# Patient Record
Sex: FEMALE | Race: WHITE | Employment: OTHER | URBAN - METROPOLITAN AREA
[De-identification: names, ages, dates, MRNs, and addresses within clinical notes are randomized per-mention and may not be internally consistent; named-entity substitution may affect disease eponyms.]

---

## 2018-05-15 ENCOUNTER — HOSPITAL ENCOUNTER (OUTPATIENT)
Dept: GENERAL RADIOLOGY | Age: 73
Discharge: HOME | End: 2018-05-15
Attending: FAMILY MEDICINE
Payer: MEDICARE

## 2018-05-15 DIAGNOSIS — J44.9 CHRONIC OBSTRUCTIVE PULMONARY DISEASE, UNSPECIFIED COPD TYPE (HCC): Chronic | ICD-10-CM

## 2018-05-15 PROCEDURE — 71046 X-RAY EXAM CHEST 2 VIEWS: CPT

## 2019-08-23 ENCOUNTER — TELEPHONE (OUTPATIENT)
Dept: CASE MANAGEMENT | Age: 74
End: 2019-08-23

## 2019-10-28 PROBLEM — K55.9 VASCULAR INSUFFICIENCY OF INTESTINE (HCC): Status: RESOLVED | Noted: 2019-10-28 | Resolved: 2019-10-28

## 2019-10-28 PROBLEM — K55.9 VASCULAR INSUFFICIENCY OF INTESTINE (HCC): Status: ACTIVE | Noted: 2019-10-28

## 2020-07-10 ENCOUNTER — HOSPITAL ENCOUNTER (OUTPATIENT)
Dept: WOUND CARE | Age: 75
Discharge: HOME | End: 2020-07-10
Attending: SURGERY
Payer: MEDICARE

## 2020-07-10 VITALS
OXYGEN SATURATION: 95 % | WEIGHT: 140 LBS | HEIGHT: 70 IN | BODY MASS INDEX: 20.04 KG/M2 | RESPIRATION RATE: 20 BRPM | DIASTOLIC BLOOD PRESSURE: 78 MMHG | HEART RATE: 82 BPM | TEMPERATURE: 97.9 F | SYSTOLIC BLOOD PRESSURE: 149 MMHG

## 2020-07-10 PROCEDURE — 29581 APPL MULTLAYER CMPRN SYS LEG: CPT

## 2020-07-10 PROCEDURE — 99213 OFFICE O/P EST LOW 20 MIN: CPT

## 2020-07-10 NOTE — WOUND CARE
[address] [place] [address], [facility] [address] Phone: [phone] Fax: [phone] Patient: Francesca Eisenmenger MRN: [id number]  SSN: [id number] YOB: 1945  Age: 66 y.o. Sex: female Return Appointment: 1 week with Keyur Chaney MD  
Tuesday for Dressing Change with Clinician Instructions: RLE Cleanse with Normal Saline Apply Xeroform- apply to wound bed. Cover with ABD Wrap with CoFlex TLC-2 layer compression with wound and lower extremity assessment. If there is any problem with the dressing (too tight, slides down, etc.) Patient to return to wound clinic to have re-wrapped by clinician. DO NOT WET-PURCHASE CAST COVER AT [facility] OR [facility] 
 
Should you experience increased redness, swelling, pain, foul odor, size of wound(s), or have a temperature over 101 degrees please contact the [address] [place] [address] Road at [phone] or if after hours contact your primary care physician or go to the hospital emergency department. Signed By: Heena Lira RN   
 July 10, 2020

## 2020-07-10 NOTE — WOUND CARE
07/10/20 1328 Wound Leg Lower Right;Lateral  
Date First Assessed: 07/10/20   Wound Type: Venous  Location: Leg Lower  Orientation: Right;Lateral  
Dressing Type  Open to air Non-Pressure Injury Full thickness (subcut/muscle) Wound Length (cm) 2.2 cm Wound Width (cm) 1.4 cm Wound Depth (cm) 0.2 Wound Surface area (cm^2) 3.08 cm^2 Condition of Base Slough;Granulation Tissue Type Percent Red 20 Tissue Type Percent Yellow 80 Drainage Amount  Moderate Drainage Color Serosanguinous Wound Odor None Periwound Skin Condition Edematous; Erythema, blanchable Cleansing and Cleansing Agents  Normal saline

## 2020-07-10 NOTE — WOUND CARE
Multilayer Compression Wrap 
 (Not Unna) Below the Knee NAME:  Vandana Whipple YOB: 1945 MEDICAL RECORD NUMBER:  [id number] DATE:  7/10/2020 Applied primary and secondary dressing as ordered. Applied multilayered dressing below the knee to right lower leg. Instructed patient/caregiver not to remove dressing and to keep it clean and dry. Instructed patient/caregiver on complications to report to provider, such as pain, numbness in toes, heavy drainage, and slippage of dressing. Instructed patient on purpose of compression dressing and on activity and exercise recommendations.  
 
 
Electronically signed by Nati Allen RN on 7/10/2020 at 2:05 PM

## 2020-07-10 NOTE — DISCHARGE INSTRUCTIONS
[address]  [address], [address]  Phone: [phone]  Fax: [phone]    Patient: Jack Contreras MRN: [id number]  SSN: [id number]    YOB: 1945  Age: 66 y.o. Sex: female       Return Appointment: 1 week with Demarcus Saleh MD    Instructions: RLE    Cleanse with Normal Saline  Apply Xeroform- apply to wound bed. Cover with ABD  Wrap with CoFlex TLC-2 layer compression with wound and lower extremity assessment. If there is any problem with the dressing (too tight, slides down, etc.) Patient to return to wound clinic to have re-wrapped by clinician. DO NOT WET-PURCHASE CAST COVER AT [facility] OR [facility]    Should you experience increased redness, swelling, pain, foul odor, size of wound(s), or have a temperature over 101 degrees please contact the [address] [place] [address] Road at [phone] or if after hours contact your primary care physician or go to the hospital emergency department.     Signed By: Anila Goldberg RN     July 10, 2020

## 2020-07-14 ENCOUNTER — HOSPITAL ENCOUNTER (OUTPATIENT)
Dept: WOUND CARE | Age: 75
Discharge: HOME | End: 2020-07-14
Attending: SURGERY
Payer: MEDICARE

## 2020-07-14 VITALS — HEIGHT: 70 IN | TEMPERATURE: 98.7 F | WEIGHT: 140 LBS | BODY MASS INDEX: 20.04 KG/M2

## 2020-07-14 PROCEDURE — 29581 APPL MULTLAYER CMPRN SYS LEG: CPT

## 2020-07-14 NOTE — PROGRESS NOTES
07/14/20 0954   Wound Leg Lower Right;Lateral   Date First Assessed: 07/10/20   Wound Type: Venous  Location: Leg Lower  Orientation: Right;Lateral   Dressing Status  Old drainage   Dressing Type    (Xeroform, Adhesive bandage)   Non-Pressure Injury Full thickness (subcut/muscle)   Wound Length (cm) 2.1 cm   Wound Width (cm) 1.2 cm   Wound Depth (cm) 0.1   Wound Surface area (cm^2) 2.52 cm^2   Change in Wound Size % 18.18   Condition of Base Granulation;Slough   Tissue Type Percent Red 80   Tissue Type Percent Yellow 20   Drainage Amount  Moderate   Drainage Color Serosanguinous   Wound Odor None   Periwound Skin Condition Macerated   Cleansing and Cleansing Agents  Normal saline; Soap and water   Dressing Type Applied   (Xeroform, ABD, Coflex TLC)

## 2020-07-14 NOTE — WOUND CARE
Multilayer Compression Wrap   (Not Unna) Below the Knee    NAME:  Keli Oglesby OF BIRTH:  1945  MEDICAL RECORD NUMBER:  [id number]  DATE:  7/14/2020    Removed old Multilayer wrap if indicated and wash leg with mild soap/water. Applied primary and secondary dressing as ordered. Applied multilayered dressing below the knee to right lower leg. Instructed patient/caregiver not to remove dressing and to keep it clean and dry. Instructed patient/caregiver on complications to report to provider, such as pain, numbness in toes, heavy drainage, and slippage of dressing. Instructed patient on purpose of compression dressing and on activity and exercise recommendations.       Electronically signed by Elina Osullivan PT, [facility] on 7/14/2020 at 9:56 AM

## 2020-07-17 ENCOUNTER — HOSPITAL ENCOUNTER (OUTPATIENT)
Dept: WOUND CARE | Age: 75
Discharge: HOME | End: 2020-07-17
Attending: SURGERY
Payer: MEDICARE

## 2020-07-17 VITALS
HEART RATE: 94 BPM | WEIGHT: 138.8 LBS | OXYGEN SATURATION: 93 % | HEIGHT: 69 IN | RESPIRATION RATE: 18 BRPM | DIASTOLIC BLOOD PRESSURE: 76 MMHG | TEMPERATURE: 97.9 F | SYSTOLIC BLOOD PRESSURE: 146 MMHG | BODY MASS INDEX: 20.56 KG/M2

## 2020-07-17 PROCEDURE — 99213 OFFICE O/P EST LOW 20 MIN: CPT

## 2020-07-17 NOTE — WOUND CARE
Multilayer Compression Wrap   (Not Unna) Below the Knee    NAME:  Ann Marie De Leon OF BIRTH:  1945  MEDICAL RECORD NUMBER:  [id number]  DATE:  7/17/2020    Removed old Multilayer wrap if indicated and wash leg with mild soap/water. Applied moisturizing agent to dry skin as needed.        Electronically signed by Ivonne Rodas RN on 7/17/2020 at 2:28 PM

## 2020-07-17 NOTE — WOUND CARE
07/17/20 1407   Wound Leg lower Left; Anterior   Date First Assessed/Time First Assessed: 07/17/20 1408   Present on Hospital Admission: Yes  Primary Wound Type: (c) Other (comment)  Location: Leg lower  Wound Location Orientation: Left; Anterior   Dressing Status Dry   Dressing Type Open to air   Non-staged Wound Description Full thickness   Wound Length (cm) 3 cm   Wound Width (cm) 3 cm   Wound Depth (cm) 0.6 cm   Wound Surface Area (cm^2) 9 cm^2   Wound Volume (cm^3) 5.4 cm^3   Condition of Base Purple; Other (comment)  (old blood)   Condition of Edges Open   Tissue Type Percent Maroon/Purple 100 %   Drainage Amount Moderate   Drainage Color Dark red/purple   Wound Odor None   Ana-wound Assessment Edema;Blanchable erythema   Margins Unattached edges   Cleansing and Cleansing Agents  Normal saline   Dressing Changed Changed/New   Dressing Type Applied Packing;ABD pad;Gauze wrap (keyur)  (mesalt, abd, roll gauze, tubigrip)   Wound Leg Lower Right;Lateral   Date First Assessed: 07/10/20   Wound Type: Venous  Location: Leg Lower  Orientation: Right;Lateral   Dressing Status  Old drainage   Dressing Type  Xeroform;ABD pad;Compression Wrap/Venous Stasis   Non-Pressure Injury Full thickness (subcut/muscle)   Wound Length (cm) 2 cm   Wound Width (cm) 1 cm   Wound Depth (cm) 0.1   Wound Surface area (cm^2) 2 cm^2   Change in Wound Size % 35.06   Condition of Base Granulation;Slough   Condition of Edges Open   Tissue Type Yellow;Red   Tissue Type Percent Red 95   Tissue Type Percent Yellow 5   Drainage Amount  Moderate   Drainage Color Serosanguinous   Wound Odor None   Periwound Skin Condition Intact   Cleansing and Cleansing Agents  Soap and water   Dressing Type Applied Xeroform;ABD pad;Gauze wrap (keyur)           Patient is not taking an anticoagulant. Wounds mechanically debrided with saline and gauze.  (Left Leg Wound opened by MD)

## 2020-07-17 NOTE — WOUND CARE
[address]  [address], [address]  Phone: [phone]  Fax: [phone]    Patient: Eve Mukherjee MRN: [id number]  SSN: [id number]    YOB: 1945  Age: 66 y.o. Sex: female       Return Appointment: 2 weeks with Brian Banda MD    Instructions:   Left Lower Leg:  Cleanse wound with normal saline. WEAR CAST COVER IN Shepherd! Apply Mesalt ribbon- pack loosely into wound, filling all dead space. Cover with ABD and rolled gauze. Apply Tubigrip over dressing. Change daily. Right Lower Leg:  Cleanse wound with normal saline. WEAR CAST COVER IN Shepherd! Apply Xeroform- apply to wound bed. Cover with ABD and rolled gauze. Apply Tubigrip over dressing. Change daily. Should you experience increased redness, swelling, pain, foul odor, size of wound(s), or have a temperature over 101 degrees please contact the [address] [place] [address] Road at [phone] or if after hours contact your primary care physician or go to the hospital emergency department.     Signed By: Lory Jean-Baptiste RN     July 17, 2020

## 2020-07-17 NOTE — DISCHARGE INSTRUCTIONS
Left Lower Leg:  Cleanse wound with normal saline. WEAR CAST COVER IN Shepherd! Apply Mesalt ribbon- pack loosely into wound, filling all dead space. Cover with ABD and rolled gauze. Apply Tubigrip over dressing. Change daily. Right Lower Leg:  Cleanse wound with normal saline. WEAR CAST COVER IN Shepherd! Apply Xeroform- apply to wound bed. Cover with ABD and rolled gauze. Apply Tubigrip over dressing. Change daily.

## 2020-07-17 NOTE — PROGRESS NOTES
Wound Center Progress Note    Patient: Lobo Felder MRN: [id number]  SSN: [id number]    YOB: 1945  Age: 66 y.o. Sex: female      Subjective:     Chief Complaint:  RLE venous ulcer following fall  History of Present Illness:       Wound Caused By: fall, venous disease  Associated Signs and Symptoms: some drainage, pain  Timing: since June 2020  Quality: wound  Severity: full thickness    Previously treated at the wound center.  Has not been routinely wearing her compression. + Smoker    Past Medical History:   Diagnosis Date    Chronic obstructive pulmonary disease ([address]     uses inhalers; pt states has no pulmonologist    Chronic pain     lower back    Colitis 2001 & 2014    pt states no problems now 2/1/16    Diverticulosis of colon (without mention of hemorrhage) 5/11/2015    Fibrocystic breast     bilat breasts    Glaucoma     uses eye drops    H/O echocardiogram 3/27/14    EF 55%-60%; mild pulmonary artery HTN    History of ischemic colitis 2001    Hyperlipemia 5/11/2015    pt states no problems now 2/1/16    Hypertension     managed w/med    Occlusion and stenosis of carotid artery without mention of cerebral infarction 5/11/2015    Osteoarthritis     Osteoarthrosis, unspecified whether generalized or localized, hand 5/11/2015    Osteoporosis, unspecified 5/11/2015    Post-operative nausea and vomiting     pt states hx of; pt states no current problems after left shoulder replacement    Proteinuria 5/11/2015    Sciatica of right side     takes lyrica    Shingles 1980    Status post total left knee replacement 2/24/2016    Syncope 1/2014    X1 episode    Tobacco use disorder 5/11/2015    1/2 ppd for 50 yrs    Vitamin D deficiency 5/11/2015    takes vitamin d supplement      Past Surgical History:   Procedure Laterality Date    HX APPENDECTOMY      HX BREAST BIOPSY Right     X2 benign biopsies    HX COLONOSCOPY  2014    HX HYSTERECTOMY  1976    due to endometriosis    HX KNEE REPLACEMENT Left 2016    HX SHOULDER REPLACEMENT Left 2014     Family History   Problem Relation Age of Onset    Heart Disease Father     Hypertension Father     Heart Attack Father     Alcohol abuse Mother     Cancer Mother         MOUTH,THROAT CA      Social History     Tobacco Use    Smoking status: Current Every Day Smoker     Packs/day: 1.00     Years: 50.00     Pack years: 50.00    Smokeless tobacco: Never Used   Substance Use Topics    Alcohol use: No       Prior to Admission medications    Medication Sig Start Date End Date Taking? Authorizing Provider   OTHER This is to certify that Marcial Mendoza  : 1945 has the following conditions: an inability to ordinarily walk one hundred feet nonstop without aggravating an existing medical condition, including increase of pain 7/15/20   Abiel Jain MD   fluticasone-umeclidinium-vilanterol (TRELEGY ELLIPTA) 100-62.5-25 mcg inhaler Take 1 Puff by inhalation daily. 20   Abiel Jain MD   budesonide-formoteroL [facility]) 160-4.5 mcg/actuation HFAA Take 2 Puffs by inhalation two (2) times a day. 20   Abiel Jain MD   meloxicam (MOBIC) 15 mg tablet Take 1 Tab by mouth daily. 3/25/20   PARKER Tamayo   albuterol (VENTOLIN HFA) 90 mcg/actuation inhaler Take 1 Puff by inhalation every four (4) hours as needed for Wheezing. And Wheezing 10/28/19   Abiel Jain MD   lisinopril (PRINIVIL, ZESTRIL) 20 mg tablet Take 1 Tab by mouth daily. 10/28/19   Abiel Jain MD   DISABLED PLACARD (DISABLED PLACARD) DMV An inability to ordinarily walk one hundred feet without aggravating an existing medical condition, including the increase of pain 19   Abiel Jain MD   conjugated estrogens (PREMARIN) 0.625 mg tablet Take 1 Tab by mouth daily. 3/29/19   Abiel Jain MD   ergocalciferol (VITAMIN D2) 50,000 unit capsule Take 1 Cap by mouth every seven (7) days.  5/10/18   Abiel Jain MD   OTHER This is to certify that Carolina Leyva  : 1945 has the following conditions: an inability to ordinarily walk one hundred feet nonstop without aggravating an existing medical condition, including increase of pain 5/10/18   Jumana Hess MD     No Known Allergies     Review of Systems:  CONSTITUTIONAL: No fever, chills  HEAD: No headache  EYES: No visual loss  ENT: No hearing loss  SKIN: No rash  CARDIOVASCULAR: No chest pain  RESPIRATORY: No shortness of breath  GASTROINTESTINAL: No nausea, vomiting  GENITOURINARY: No excessive urination  NEUROLOGICAL: No weakness  MUSCULOSKELETAL: No muscle pain. No neck pain  HEMATOLOGIC: No easy bleeding  LYMPHATICS: No lymphedema. PSYCHIATRIC: No current depression  ENDOCRINOLOGIC: No high sugars  ALLERGIES: No history of asthma, hives, eczema or rhinitis. No results found for: HBA1C, XGC6PQDP, HGBE8, TZJ7COWJ, GEJ0TJRH     Immunization History   Administered Date(s) Administered    Influenza High Dose Vaccine PF 10/24/2018    Influenza Vaccine 2013, 2019    Influenza Vaccine (Tri) Adjuvanted 10/24/2018, 2019    Pneumococcal Polysaccharide (PPSV-23) 2018    TB Skin Test (PPD) Intradermal 2016    Tdap 2015       Body mass index is 20.09 kg/m². Current medications:  Current Outpatient Medications   Medication Sig Dispense Refill    OTHER This is to certify that Carolina Leyva YOB: 1945 has the following conditions: an inability to ordinarily walk one hundred feet nonstop without aggravating an existing medical condition, including increase of pain 1 Each 0    fluticasone-umeclidinium-vilanterol (TRELEGY ELLIPTA) 100-62.5-25 mcg inhaler Take 1 Puff by inhalation daily. 1 Inhaler 5    budesonide-formoteroL (SYMBICORT) 160-4.5 mcg/actuation HFAA Take 2 Puffs by inhalation two (2) times a day. 1 Inhaler 6    meloxicam (MOBIC) 15 mg tablet Take 1 Tab by mouth daily.  30 Tab 3    albuterol (VENTOLIN HFA) 90 mcg/actuation inhaler Take 1 Puff by inhalation every four (4) hours as needed for Wheezing. And Wheezing 1 Inhaler 6    lisinopril (PRINIVIL, ZESTRIL) 20 mg tablet Take 1 Tab by mouth daily. 90 Tab 3    DISABLED PLACARD (DISABLED PLACARD) DMV An inability to ordinarily walk one hundred feet without aggravating an existing medical condition, including the increase of pain 1 Each 0    conjugated estrogens (PREMARIN) 0.625 mg tablet Take 1 Tab by mouth daily. 30 Tab 6    ergocalciferol (VITAMIN D2) 50,000 unit capsule Take 1 Cap by mouth every seven (7) days. 12 Cap 3    OTHER This is to certify that Mayra Klein  : 1945 has the following conditions: an inability to ordinarily walk one hundred feet nonstop without aggravating an existing medical condition, including increase of pain 1 Each 0         Objective:     Physical Exam:     Visit Vitals  /78 (BP 1 Location: Left arm, BP Patient Position: At rest)   Pulse 82   Temp 97.9 °F (36.6 °C)   Resp 20   Ht 5' 10\" (1.778 m)   Wt 63.5 kg (140 lb)   SpO2 95%   BMI 20.09 kg/m²       General: well developed thin but overall healthy appearing  Psych: cooperative. Pleasant  Neuro: alert and oriented to person/place/situation. Otherwise nonfocal.  Derm: Normal turgor for age, dry skin  HEENT: Normocephalic, atraumatic. EOMI. Neck: Normal range of motion.   Chest: Respirations nonlabored  Cardio: RRR  Abdomen: Soft, nondistended  Lower extremities:   No hemosiderrosis  + large and small vessel varicosities  Capillary refill <3 sec    Right  2+ DP/PT    Left  2+ DP/PT            Ulcer Description:   Wound Leg Lower Right;Lateral (Active)   Dressing Status  Old drainage 20 09   Dressing Type  Open to air 07/10/20 1328   Non-Pressure Injury Full thickness (subcut/muscle) 20 09   Wound Length (cm) 2.1 cm 20 09   Wound Width (cm) 1.2 cm 20 09   Wound Depth (cm) 0.1 20 09   Wound Surface area (cm^2) 2.52 cm^2 20 09 Change in Wound Size % 18.18 07/14/20 0954   Condition of Base Granulation;Slough 07/14/20 0954   Tissue Type Percent Red 80 07/14/20 0954   Tissue Type Percent Yellow 20 07/14/20 0954   Drainage Amount  Moderate 07/14/20 0954   Drainage Color Serosanguinous 07/14/20 0954   Wound Odor None 07/14/20 0954   Periwound Skin Condition Macerated 07/14/20 0954   Cleansing and Cleansing Agents  Normal saline; Soap and water 07/14/20 0954   Number of days: 7       [REMOVED] Wound Shoulder Left (Removed)   Number of days: 2176       [REMOVED] Wound Knee Left (Removed)   Number of days: 8420           Problem List  Date Reviewed: 10/28/2019          Codes Class Noted    Status post total left knee replacement ICD-10-CM: C16.513  ICD-9-CM: V43.65  2/24/2016        Osteoarthritis ICD-10-CM: M19.90  ICD-9-CM: 715.90  Unknown        Age-related osteoporosis without current pathological fracture ICD-10-CM: M81.0  ICD-9-CM: 733.01  5/11/2015        Primary osteoarthritis involving multiple joints ICD-10-CM: M89.49  ICD-9-CM: 715.98  5/11/2015        Vitamin D deficiency ICD-10-CM: E55.9  ICD-9-CM: 268.9  5/11/2015        Tobacco use disorder, continuous ICD-10-CM: F17.209  ICD-9-CM: 305.1  5/11/2015        Hyperlipemia ICD-10-CM: E78.5  ICD-9-CM: 272.4  5/11/2015        Occlusion and stenosis of carotid artery without mention of cerebral infarction ICD-10-CM: I65.29  ICD-9-CM: 433.10  5/11/2015        Diverticulosis of colon (without mention of hemorrhage) ICD-10-CM: K57.30  ICD-9-CM: 562.10  5/11/2015        Essential hypertension ICD-10-CM: I10  ICD-9-CM: 401.9  8/18/2014        COPD (chronic obstructive pulmonary disease) (HCC) (Chronic) ICD-10-CM: J44.9  ICD-9-CM: 496  8/18/2014        Glaucoma (Chronic) ICD-10-CM: H40.9  ICD-9-CM: 365.9  8/18/2014                    Assessment/Plan:     Venous ulcer initiated by trauma. Start physician directed multilayer compression. Discussed smoking cessation.     Signed By: Eduard Garcia Leticia Guerrero MD

## 2020-07-27 NOTE — PROGRESS NOTES
Wound Center Progress Note    Patient: Kimberly Guerrero MRN: [id number]  SSN: [id number]    YOB: 1945  Age: 66 y.o. Sex: female      Subjective:     Chief Complaint:  RLE venous ulcer following fall  History of Present Illness:       Wound Caused By: fall, venous disease  Associated Signs and Symptoms: some drainage, pain  Timing: since June 2020  Quality: wound  Severity: full thickness    Previously treated at the wound center. Has not been routinely wearing her compression. + Smoker    Doing well with dressings but has sustained another wound. Struck by skateboard by wound of dangelo. Minimal bleeding. No orthopedic injuries.      Past Medical History:   Diagnosis Date    Chronic obstructive pulmonary disease ([address]     uses inhalers; pt states has no pulmonologist    Chronic pain     lower back    Colitis 2001 & 2014    pt states no problems now 2/1/16    Diverticulosis of colon (without mention of hemorrhage) 5/11/2015    Fibrocystic breast     bilat breasts    Glaucoma     uses eye drops    H/O echocardiogram 3/27/14    EF 55%-60%; mild pulmonary artery HTN    History of ischemic colitis 2001    Hyperlipemia 5/11/2015    pt states no problems now 2/1/16    Hypertension     managed w/med    Occlusion and stenosis of carotid artery without mention of cerebral infarction 5/11/2015    Osteoarthritis     Osteoarthrosis, unspecified whether generalized or localized, hand 5/11/2015    Osteoporosis, unspecified 5/11/2015    Post-operative nausea and vomiting     pt states hx of; pt states no current problems after left shoulder replacement    Proteinuria 5/11/2015    Sciatica of right side     takes lyrica    Shingles 1980    Status post total left knee replacement 2/24/2016    Syncope 1/2014    X1 episode    Tobacco use disorder 5/11/2015    1/2 ppd for 50 yrs    Vitamin D deficiency 5/11/2015    takes vitamin d supplement      Past Surgical History:   Procedure Laterality Date    HX APPENDECTOMY      HX BREAST BIOPSY Right     X2 benign biopsies    HX COLONOSCOPY      HX HYSTERECTOMY  1976    due to endometriosis    HX KNEE REPLACEMENT Left 2016    HX SHOULDER REPLACEMENT Left 2014     Family History   Problem Relation Age of Onset    Heart Disease Father     Hypertension Father     Heart Attack Father     Alcohol abuse Mother     Cancer Mother         MOUTH,THROAT CA      Social History     Tobacco Use    Smoking status: Current Every Day Smoker     Packs/day: 1.00     Years: 50.00     Pack years: 50.00    Smokeless tobacco: Never Used   Substance Use Topics    Alcohol use: No       Prior to Admission medications    Medication Sig Start Date End Date Taking? Authorizing Provider   conjugated estrogens (Premarin) 0.625 mg tablet Take 1 Tab by mouth daily. 20   Jarret Adams MD   lisinopriL (PRINIVIL, ZESTRIL) 20 mg tablet Take 1 Tab by mouth daily. 20   Jarret Adams MD   OTHER This is to certify that Nadia Mathews  : 1945 has the following conditions: an inability to ordinarily walk one hundred feet nonstop without aggravating an existing medical condition, including increase of pain 7/15/20   Jarret Adams MD   fluticasone-umeclidinium-vilanterol (TRELEGY ELLIPTA) 100-62.5-25 mcg inhaler Take 1 Puff by inhalation daily. 20   Jarret Adams MD   budesonide-formoteroL [facility]) 160-4.5 mcg/actuation HFAA Take 2 Puffs by inhalation two (2) times a day. 20   Jarret Adams MD   meloxicam (MOBIC) 15 mg tablet Take 1 Tab by mouth daily. 3/25/20   Corinne Stamps, FNP   albuterol (VENTOLIN HFA) 90 mcg/actuation inhaler Take 1 Puff by inhalation every four (4) hours as needed for Wheezing.  And Wheezing 10/28/19   Jarret Adams MD   DISABLED PLACARD (DISABLED PLACARD) DMV An inability to ordinarily walk one hundred feet without aggravating an existing medical condition, including the increase of pain 19 Jarret Adams MD   ergocalciferol (VITAMIN D2) 50,000 unit capsule Take 1 Cap by mouth every seven (7) days. 5/10/18   Jarret Adams MD   OTHER This is to certify that Nadia Mathews  : 1945 has the following conditions: an inability to ordinarily walk one hundred feet nonstop without aggravating an existing medical condition, including increase of pain 5/10/18   Jarret Adams MD     No Known Allergies     Review of Systems:  CONSTITUTIONAL: No fever, chills  HEAD: No headache  EYES: No visual loss  ENT: No hearing loss  SKIN: No rash  CARDIOVASCULAR: No chest pain  RESPIRATORY: No shortness of breath  GASTROINTESTINAL: No nausea, vomiting  GENITOURINARY: No excessive urination  NEUROLOGICAL: No weakness  MUSCULOSKELETAL: No muscle pain. No neck pain  HEMATOLOGIC: No easy bleeding  LYMPHATICS: No lymphedema. PSYCHIATRIC: No current depression  ENDOCRINOLOGIC: No high sugars  ALLERGIES: No history of asthma, hives, eczema or rhinitis. No results found for: HBA1C, NMP6DAJG, HGBE8, FZQ2XMNY, DZW0EFCX, RDT7OLPE     Immunization History   Administered Date(s) Administered    Influenza High Dose Vaccine PF 10/24/2018    Influenza Vaccine 2013, 2019    Influenza Vaccine (Tri) Adjuvanted 10/24/2018, 2019    Pneumococcal Polysaccharide (PPSV-23) 2018    TB Skin Test (PPD) Intradermal 2016    Tdap 2015       Body mass index is 20.5 kg/m². Current medications:  Current Outpatient Medications   Medication Sig Dispense Refill    conjugated estrogens (Premarin) 0.625 mg tablet Take 1 Tab by mouth daily. 30 Tab 6    lisinopriL (PRINIVIL, ZESTRIL) 20 mg tablet Take 1 Tab by mouth daily.  90 Tab 3    OTHER This is to certify that Nadia Reid  : 1945 has the following conditions: an inability to ordinarily walk one hundred feet nonstop without aggravating an existing medical condition, including increase of pain 1 Each 0    fluticasone-umeclidinium-vilanterol (TRELEGY ELLIPTA) 100-62.5-25 mcg inhaler Take 1 Puff by inhalation daily. 1 Inhaler 5    budesonide-formoteroL (SYMBICORT) 160-4.5 mcg/actuation HFAA Take 2 Puffs by inhalation two (2) times a day. 1 Inhaler 6    meloxicam (MOBIC) 15 mg tablet Take 1 Tab by mouth daily. 30 Tab 3    albuterol (VENTOLIN HFA) 90 mcg/actuation inhaler Take 1 Puff by inhalation every four (4) hours as needed for Wheezing. And Wheezing 1 Inhaler 6    DISABLED PLACARD (DISABLED PLACARD) DMV An inability to ordinarily walk one hundred feet without aggravating an existing medical condition, including the increase of pain 1 Each 0    ergocalciferol (VITAMIN D2) 50,000 unit capsule Take 1 Cap by mouth every seven (7) days. 12 Cap 3    OTHER This is to certify that Nay Guzman  : 1945 has the following conditions: an inability to ordinarily walk one hundred feet nonstop without aggravating an existing medical condition, including increase of pain 1 Each 0         Objective:     Physical Exam:     Visit Vitals  /76 (BP 1 Location: Left arm, BP Patient Position: Sitting)   Pulse 94   Temp 97.9 °F (36.6 °C)   Resp 18   Ht 5' 9\" (1.753 m)   Wt 63 kg (138 lb 12.8 oz)   SpO2 93%   BMI 20.50 kg/m²       General: well developed thin but overall healthy appearing  Psych: cooperative. Pleasant  Neuro: alert and oriented to person/place/situation. Otherwise nonfocal.  Derm: Normal turgor for age, dry skin  HEENT: Normocephalic, atraumatic. EOMI. Neck: Normal range of motion.   Chest: Respirations nonlabored  Cardio: RRR  Abdomen: Soft, nondistended  Lower extremities:   + hemosiderrosis  + large and small vessel varicosities  Capillary refill <3 sec    Right  2+ DP/PT    Left  2+ DP/PT                    Ulcer Description:   Wound Leg Lower Right;Lateral (Active)   Dressing Status  Old drainage 20 140   Dressing Type  Xeroform;ABD pad;Compression Wrap/Venous Stasis 20 1407 Non-Pressure Injury Full thickness (subcut/muscle) 07/17/20 1407   Wound Length (cm) 2 cm 07/17/20 1407   Wound Width (cm) 1 cm 07/17/20 1407   Wound Depth (cm) 0.1 07/17/20 1407   Wound Surface area (cm^2) 2 cm^2 07/17/20 1407   Change in Wound Size % 35.06 07/17/20 1407   Condition of Base Granulation;Slough 07/17/20 1407   Condition of Edges Open 07/17/20 1407   Tissue Type Yellow; Red 07/17/20 1407   Tissue Type Percent Red 95 07/17/20 1407   Tissue Type Percent Yellow 5 07/17/20 1407   Drainage Amount  Moderate 07/17/20 1407   Drainage Color Serosanguinous 07/17/20 1407   Wound Odor None 07/17/20 1407   Periwound Skin Condition Intact 07/17/20 1407   Cleansing and Cleansing Agents  Soap and water 07/17/20 1407   Dressing Type Applied Xeroform;ABD pad;Gauze wrap (keyur) 07/17/20 1407   Number of days: 17       Wound Leg lower Left; Anterior (Active)   Dressing Status Dry 07/17/20 1407   Dressing Type Open to air 07/17/20 1407   Non-staged Wound Description Full thickness 07/17/20 1407   Wound Length (cm) 3 cm 07/17/20 1407   Wound Width (cm) 3 cm 07/17/20 1407   Wound Depth (cm) 0.6 cm 07/17/20 1407   Wound Surface Area (cm^2) 9 cm^2 07/17/20 1407   Wound Volume (cm^3) 5.4 cm^3 07/17/20 1407   Condition of Base Purple; Other (comment) 07/17/20 1407   Condition of Edges Open 07/17/20 1407   Tissue Type Percent Maroon/Purple 100 % 07/17/20 1407   Drainage Amount Moderate 07/17/20 1407   Drainage Color Dark red/purple 07/17/20 1407   Wound Odor None 07/17/20 1407   Ana-wound Assessment Edema;Blanchable erythema 07/17/20 1407   Margins Unattached edges 07/17/20 1407   Cleansing and Cleansing Agents  Normal saline 07/17/20 1407   Dressing Changed Changed/New 07/17/20 1407   Dressing Type Applied Packing;ABD pad;Gauze wrap (keyur) 07/17/20 1407   Number of days: 10       [REMOVED] Wound Shoulder Left (Removed)   Number of days: 2176       [REMOVED] Wound Knee Left (Removed)   Number of days: 0994           Problem List  Date Reviewed: 10/28/2019          Codes Class Noted    Status post total left knee replacement ICD-10-CM: X33.457  ICD-9-CM: V43.65  2/24/2016        Osteoarthritis ICD-10-CM: M19.90  ICD-9-CM: 715.90  Unknown        Age-related osteoporosis without current pathological fracture ICD-10-CM: M81.0  ICD-9-CM: 733.01  5/11/2015        Primary osteoarthritis involving multiple joints ICD-10-CM: M89.49  ICD-9-CM: 715.98  5/11/2015        Vitamin D deficiency ICD-10-CM: E55.9  ICD-9-CM: 268.9  5/11/2015        Tobacco use disorder, continuous ICD-10-CM: F17.209  ICD-9-CM: 305.1  5/11/2015        Hyperlipemia ICD-10-CM: E78.5  ICD-9-CM: 272.4  5/11/2015        Occlusion and stenosis of carotid artery without mention of cerebral infarction ICD-10-CM: I65.29  ICD-9-CM: 433.10  5/11/2015        Diverticulosis of colon (without mention of hemorrhage) ICD-10-CM: K57.30  ICD-9-CM: 562.10  5/11/2015        Essential hypertension ICD-10-CM: I10  ICD-9-CM: 401.9  8/18/2014        COPD (chronic obstructive pulmonary disease) (HCC) (Chronic) ICD-10-CM: J44.9  ICD-9-CM: 496  8/18/2014        Glaucoma (Chronic) ICD-10-CM: H40.9  ICD-9-CM: 365.9  8/18/2014                    Assessment/Plan:     Venous ulcer initiated by trauma. Additional wound today  Continue physician directed multilayer compression. Discussed smoking cessation.     Signed By: Jimbo Grey MD

## 2020-07-31 ENCOUNTER — HOSPITAL ENCOUNTER (OUTPATIENT)
Dept: WOUND CARE | Age: 75
Discharge: HOME | End: 2020-07-31
Attending: SURGERY
Payer: MEDICARE

## 2020-07-31 VITALS
TEMPERATURE: 97.7 F | BODY MASS INDEX: 20.65 KG/M2 | RESPIRATION RATE: 18 BRPM | WEIGHT: 139.4 LBS | HEIGHT: 69 IN | SYSTOLIC BLOOD PRESSURE: 153 MMHG | DIASTOLIC BLOOD PRESSURE: 75 MMHG

## 2020-07-31 PROCEDURE — 99213 OFFICE O/P EST LOW 20 MIN: CPT

## 2020-07-31 NOTE — WOUND CARE
07/31/20 1339 Wound Leg lower Left; Anterior Date First Assessed/Time First Assessed: 07/17/20 1408   Present on Hospital Admission: Yes  Primary Wound Type: (c) Other (comment)  Location: Leg lower  Wound Location Orientation: Left; Anterior Dressing Type  
(mesalt, abd, rolled gauze) Non-staged Wound Description Full thickness Wound Length (cm) 2.5 cm Wound Width (cm) 2.2 cm Wound Depth (cm) 0.1 cm Wound Surface Area (cm^2) 5.5 cm^2 Wound Volume (cm^3) 0.55 cm^3 Change in Wound Size % 38.89 Condition of Base Granulation;Epithelializing Epithelialization (%) 10 Tissue Type Percent Red 90 Drainage Amount Small Drainage Color Serosanguinous Wound Odor None Ana-wound Assessment Edema Cleansing and Cleansing Agents  Normal saline Dressing Changed Changed/New Dressing Type Applied  
(purachol, abd, rolled gauze) Wound Leg Lower Right;Lateral  
Date First Assessed: 07/10/20   Wound Type: Venous  Location: Leg Lower  Orientation: Right;Lateral  
Dressing Status  Clean, dry, and intact Dressing Type   
(xeroform, abd, rolled gauze) Non-Pressure Injury Full thickness (subcut/muscle) Wound Length (cm) 0 cm Wound Width (cm) 0 cm Wound Depth (cm) 0 Wound Surface area (cm^2) 0 cm^2 Change in Wound Size % 100 Condition of Base Epithelializing Epithelialization (%) 100 Tissue Type Percent Pink 100 Drainage Amount  None Wound Odor None Periwound Skin Condition Intact Cleansing and Cleansing Agents  Normal saline Dressing Type Applied Foam

## 2020-07-31 NOTE — WOUND CARE
[address] [place] [address] [place], [address] [address] Phone: [phone] Fax: [phone] Patient: Jade Lui MRN: [id number]  SSN: [id number] YOB: 1945  Age: 66 y.o. Sex: female Return Appointment: 2 weeks with Minerva Michelle MD 
 
Instructions: Right leg Border foam to protect fragile skin. Keep covered for at least one week. Left leg Cleanse wound and periwound with wound cleanser or normal saline. Purachol (may substitute equivalent collagen):cut to approximate wound size, apply to wound bed. Secure with abd and rolled gauze. Change 3x weekly. Tubigrips bilaterally. Follow up in wound center in 2 weeks. Should you experience increased redness, swelling, pain, foul odor, size of wound(s), or have a temperature over 101 degrees please contact the [address] [place] [address] Road at [phone] or if after hours contact your primary care physician or go to the hospital emergency department. Signed By: Melvi Aguila July 31, 2020

## 2020-07-31 NOTE — DISCHARGE INSTRUCTIONS
[address]  [address], [address]  Phone: [phone]  Fax: [phone]    Patient: Rupesh Bonilla MRN: [id number]  SSN: [id number]    YOB: 1945  Age: 66 y.o. Sex: female       Return Appointment: 2 weeks with Deri Bosworth, MD  Right leg  Border foam to protect fragile skin. Keep covered for at least one week.      Left leg  Cleanse wound and periwound with wound cleanser or normal saline. Purachol (may substitute equivalent collagen):cut to approximate wound size, apply to wound bed. Secure with abd and rolled gauze. Change 3x weekly. Tubigrips bilaterally.      Follow up in wound center in 2 weeks. Instructions: ***    Should you experience increased redness, swelling, pain, foul odor, size of wound(s), or have a temperature over 101 degrees please contact the [address] [place] [address] Road at [phone] or if after hours contact your primary care physician or go to the hospital emergency department.     Signed By: Mark Anthony Smith     July 31, 2020

## 2020-08-11 NOTE — PROGRESS NOTES
Wound Center Progress Note    Patient: Lobo Felder MRN: [id number]  SSN: [id number]    YOB: 1945  Age: 66 y.o. Sex: female      Subjective:     Chief Complaint:  RLE venous ulcer following fall  History of Present Illness:       Wound Caused By: fall, venous disease  Associated Signs and Symptoms: some drainage, pain  Timing: since June 2020  Quality: wound  Severity: full thickness    Previously treated at the wound center. Has not been routinely wearing her compression. + Smoker    Doing well with dressings. No new wounds since last. Tolerating dressings.      Past Medical History:   Diagnosis Date    Chronic obstructive pulmonary disease ([address]     uses inhalers; pt states has no pulmonologist    Chronic pain     lower back    Colitis 2001 & 2014    pt states no problems now 2/1/16    Diverticulosis of colon (without mention of hemorrhage) 5/11/2015    Fibrocystic breast     bilat breasts    Glaucoma     uses eye drops    H/O echocardiogram 3/27/14    EF 55%-60%; mild pulmonary artery HTN    History of ischemic colitis 2001    Hyperlipemia 5/11/2015    pt states no problems now 2/1/16    Hypertension     managed w/med    Occlusion and stenosis of carotid artery without mention of cerebral infarction 5/11/2015    Osteoarthritis     Osteoarthrosis, unspecified whether generalized or localized, hand 5/11/2015    Osteoporosis, unspecified 5/11/2015    Post-operative nausea and vomiting     pt states hx of; pt states no current problems after left shoulder replacement    Proteinuria 5/11/2015    Sciatica of right side     takes lyrica    Shingles 1980    Status post total left knee replacement 2/24/2016    Syncope 1/2014    X1 episode    Tobacco use disorder 5/11/2015    1/2 ppd for 50 yrs    Vitamin D deficiency 5/11/2015    takes vitamin d supplement      Past Surgical History:   Procedure Laterality Date    HX APPENDECTOMY      HX BREAST BIOPSY Right     X2 benign biopsies    HX COLONOSCOPY      HX HYSTERECTOMY  1976    due to endometriosis    HX KNEE REPLACEMENT Left 2016    HX SHOULDER REPLACEMENT Left 2014     Family History   Problem Relation Age of Onset    Heart Disease Father     Hypertension Father     Heart Attack Father     Alcohol abuse Mother     Cancer Mother         MOUTH,THROAT CA      Social History     Tobacco Use    Smoking status: Current Every Day Smoker     Packs/day: 1.00     Years: 50.00     Pack years: 50.00    Smokeless tobacco: Never Used   Substance Use Topics    Alcohol use: No       Prior to Admission medications    Medication Sig Start Date End Date Taking? Authorizing Provider   conjugated estrogens (Premarin) 0.625 mg tablet Take 1 Tab by mouth daily. 20  Yes Lexie Padilla MD   lisinopriL (PRINIVIL, ZESTRIL) 20 mg tablet Take 1 Tab by mouth daily. 20  Yes Lexie Padilla MD   fluticasone-umeclidinium-vilanterol (TRELEGY ELLIPTA) 100-62.5-25 mcg inhaler Take 1 Puff by inhalation daily. 20  Yes Lexie Padilla MD   budesonide-formoteroL (SYMBICORT) 160-4.5 mcg/actuation HFAA Take 2 Puffs by inhalation two (2) times a day. 20  Yes Lexie Padilla MD   meloxicam (MOBIC) 15 mg tablet Take 1 Tab by mouth daily. 3/25/20  Yes PARKER Grier   albuterol (VENTOLIN HFA) 90 mcg/actuation inhaler Take 1 Puff by inhalation every four (4) hours as needed for Wheezing. And Wheezing 10/28/19  Yes Lexie Padilla MD   ergocalciferol (VITAMIN D2) 50,000 unit capsule Take 1 Cap by mouth every seven (7) days.  5/10/18  Yes Lexie Padilla MD   OTHER This is to certify that Gissel Wisdom  : 1945 has the following conditions: an inability to ordinarily walk one hundred feet nonstop without aggravating an existing medical condition, including increase of pain 7/15/20   Lexie Padilla MD   DISABLED PLACARD (DISABLED PLACARD) DMV An inability to ordinarily walk one hundred feet without aggravating an existing medical condition, including the increase of pain 19   Aaron Meckel, MD   OTHER This is to certify that John Mayberry  : 1945 has the following conditions: an inability to ordinarily walk one hundred feet nonstop without aggravating an existing medical condition, including increase of pain 5/10/18   Aaron Meckel, MD     No Known Allergies     Review of Systems:  CONSTITUTIONAL: No fever, chills  HEAD: No headache  EYES: No visual loss  ENT: No hearing loss  SKIN: No rash  CARDIOVASCULAR: No chest pain  RESPIRATORY: No shortness of breath  GASTROINTESTINAL: No nausea, vomiting  GENITOURINARY: No excessive urination  NEUROLOGICAL: No weakness  MUSCULOSKELETAL: No muscle pain. No neck pain  HEMATOLOGIC: No easy bleeding  LYMPHATICS: No lymphedema. PSYCHIATRIC: No current depression  ENDOCRINOLOGIC: No high sugars  ALLERGIES: No history of asthma, hives, eczema or rhinitis. No results found for: HBA1C, YOK6IHBG, HGBE8, MWQ9ZERX, SNY6QHWJ, DVN5GLAR     Immunization History   Administered Date(s) Administered    Influenza High Dose Vaccine PF 10/24/2018    Influenza Vaccine 2013, 2019    Influenza Vaccine (Tri) Adjuvanted 10/24/2018, 2019    Pneumococcal Polysaccharide (PPSV-23) 2018    TB Skin Test (PPD) Intradermal 2016    Tdap 2015       Body mass index is 20.59 kg/m². Current medications:  Current Outpatient Medications   Medication Sig Dispense Refill    conjugated estrogens (Premarin) 0.625 mg tablet Take 1 Tab by mouth daily. 30 Tab 6    lisinopriL (PRINIVIL, ZESTRIL) 20 mg tablet Take 1 Tab by mouth daily. 90 Tab 3    fluticasone-umeclidinium-vilanterol (TRELEGY ELLIPTA) 100-62.5-25 mcg inhaler Take 1 Puff by inhalation daily. 1 Inhaler 5    budesonide-formoteroL (SYMBICORT) 160-4.5 mcg/actuation HFAA Take 2 Puffs by inhalation two (2) times a day. 1 Inhaler 6    meloxicam (MOBIC) 15 mg tablet Take 1 Tab by mouth daily.  30 Tab 3    albuterol (VENTOLIN HFA) 90 mcg/actuation inhaler Take 1 Puff by inhalation every four (4) hours as needed for Wheezing. And Wheezing 1 Inhaler 6    ergocalciferol (VITAMIN D2) 50,000 unit capsule Take 1 Cap by mouth every seven (7) days. 12 Cap 3    OTHER This is to certify that Gissel Wisdom  : 1945 has the following conditions: an inability to ordinarily walk one hundred feet nonstop without aggravating an existing medical condition, including increase of pain 1 Each 0    DISABLED PLACARD (DISABLED PLACARD) DMV An inability to ordinarily walk one hundred feet without aggravating an existing medical condition, including the increase of pain 1 Each 0    OTHER This is to certify that Gissel Wisdom  : 1945 has the following conditions: an inability to ordinarily walk one hundred feet nonstop without aggravating an existing medical condition, including increase of pain 1 Each 0         Objective:     Physical Exam:     Visit Vitals  /75 (BP 1 Location: Right arm, BP Patient Position: Sitting)   Temp 97.7 °F (36.5 °C)   Resp 18   Ht 5' 9\" (1.753 m)   Wt 63.2 kg (139 lb 6.4 oz)   BMI 20.59 kg/m²       General: well developed thin but overall healthy appearing  Psych: cooperative. Pleasant  Neuro: alert and oriented to person/place/situation. Otherwise nonfocal.  Derm: Normal turgor for age, dry skin  HEENT: Normocephalic, atraumatic. EOMI. Neck: Normal range of motion. Chest: Respirations nonlabored  Cardio: RRR  Abdomen: Soft, nondistended  Lower extremities:   + hemosiderrosis  + large and small vessel varicosities  Capillary refill <3 sec    Right  2+ DP/PT    Left  2+ DP/PT                    Ulcer Description:   Wound Leg lower Left; Anterior (Active)   Dressing Status Dry 20 1407   Dressing Type Open to air 20 1407   Non-staged Wound Description Full thickness 20 1339   Wound Length (cm) 2.5 cm 20 1339   Wound Width (cm) 2.2 cm 20 1339   Wound Depth (cm) 0.1 cm 07/31/20 1339   Wound Surface Area (cm^2) 5.5 cm^2 07/31/20 1339   Wound Volume (cm^3) 0.55 cm^3 07/31/20 1339   Change in Wound Size % 38.89 07/31/20 1339   Condition of Base Granulation;Epithelializing 07/31/20 1339   Condition of Edges Open 07/17/20 1407   Epithelialization (%) 10 07/31/20 1339   Tissue Type Percent Maroon/Purple 100 % 07/17/20 1407   Tissue Type Percent Red 90 07/31/20 1339   Drainage Amount Small 07/31/20 1339   Drainage Color Serosanguinous 07/31/20 1339   Wound Odor None 07/31/20 1339   Ana-wound Assessment Edema 07/31/20 1339   Margins Unattached edges 07/17/20 1407   Cleansing and Cleansing Agents  Normal saline 07/31/20 1339   Dressing Changed Changed/New 07/31/20 1339   Dressing Type Applied Packing;ABD pad;Gauze wrap (keyur) 07/17/20 1407   Number of days: 25       [REMOVED] Wound Shoulder Left (Removed)   Number of days: 2176       [REMOVED] Wound Knee Left (Removed)   Number of days: 9480       [REMOVED] Wound Leg Lower Right;Lateral (Removed)   Dressing Status  Clean, dry, and intact 07/31/20 1339   Dressing Type  Xeroform;ABD pad;Compression Wrap/Venous Stasis 07/17/20 1407   Non-Pressure Injury Full thickness (subcut/muscle) 07/31/20 1339   Wound Length (cm) 0 cm 07/31/20 1339   Wound Width (cm) 0 cm 07/31/20 1339   Wound Depth (cm) 0 07/31/20 1339   Wound Surface area (cm^2) 0 cm^2 07/31/20 1339   Change in Wound Size % 100 07/31/20 1339   Condition of Base Epithelializing 07/31/20 1339   Condition of Edges Open 07/17/20 1407   Epithelialization (%) 100 07/31/20 1339   Tissue Type Yellow; Red 07/17/20 1407   Tissue Type Percent Pink 100 07/31/20 1339   Tissue Type Percent Red 95 07/17/20 1407   Tissue Type Percent Yellow 5 07/17/20 1407   Drainage Amount  None 07/31/20 1339   Drainage Color Serosanguinous 07/17/20 1407   Wound Odor None 07/31/20 1339   Periwound Skin Condition Intact 07/31/20 1339   Cleansing and Cleansing Agents  Normal saline 07/31/20 1339   Dressing Type Applied Foam 07/31/20 1339   Number of days: 21           Problem List  Date Reviewed: 10/28/2019          Codes Class Noted    Status post total left knee replacement ICD-10-CM: T21.833  ICD-9-CM: V43.65  2/24/2016        Osteoarthritis ICD-10-CM: M19.90  ICD-9-CM: 715.90  Unknown        Age-related osteoporosis without current pathological fracture ICD-10-CM: M81.0  ICD-9-CM: 733.01  5/11/2015        Primary osteoarthritis involving multiple joints ICD-10-CM: M89.49  ICD-9-CM: 715.98  5/11/2015        Vitamin D deficiency ICD-10-CM: E55.9  ICD-9-CM: 268.9  5/11/2015        Tobacco use disorder, continuous ICD-10-CM: F17.209  ICD-9-CM: 305.1  5/11/2015        Hyperlipemia ICD-10-CM: E78.5  ICD-9-CM: 272.4  5/11/2015        Occlusion and stenosis of carotid artery without mention of cerebral infarction ICD-10-CM: I65.29  ICD-9-CM: 433.10  5/11/2015        Diverticulosis of colon (without mention of hemorrhage) ICD-10-CM: K57.30  ICD-9-CM: 562.10  5/11/2015        Essential hypertension ICD-10-CM: I10  ICD-9-CM: 401.9  8/18/2014        COPD (chronic obstructive pulmonary disease) (HCC) (Chronic) ICD-10-CM: J44.9  ICD-9-CM: 496  8/18/2014        Glaucoma (Chronic) ICD-10-CM: H40.9  ICD-9-CM: 365.9  8/18/2014                    Assessment/Plan:     Venous ulcer initiated by trauma. Granulatio improving. Continue physician directed multilayer compression. Will change to collagen. Discussed smoking cessation.     Signed By: Jhonatan Black MD

## 2020-08-14 ENCOUNTER — HOSPITAL ENCOUNTER (OUTPATIENT)
Dept: WOUND CARE | Age: 75
Discharge: HOME | End: 2020-08-14
Attending: SURGERY
Payer: MEDICARE

## 2020-08-14 VITALS
OXYGEN SATURATION: 96 % | BODY MASS INDEX: 20.5 KG/M2 | RESPIRATION RATE: 18 BRPM | HEART RATE: 79 BPM | WEIGHT: 138.4 LBS | DIASTOLIC BLOOD PRESSURE: 70 MMHG | HEIGHT: 69 IN | SYSTOLIC BLOOD PRESSURE: 139 MMHG | TEMPERATURE: 97.5 F

## 2020-08-14 PROCEDURE — 99213 OFFICE O/P EST LOW 20 MIN: CPT

## 2020-08-14 NOTE — WOUND CARE
08/14/20 1319 Wound Leg lower Left; Anterior Date First Assessed/Time First Assessed: 07/17/20 1408   Present on Hospital Admission: Yes  Primary Wound Type: (c) Other (comment)  Location: Leg lower  Wound Location Orientation: Left; Anterior Dressing Status Old drainage Dressing Type  
(collagen, abd, rolled gauze, tubigrip) Non-staged Wound Description Full thickness Wound Length (cm) 1.5 cm Wound Width (cm) 0.5 cm Wound Depth (cm) 0.1 cm Wound Surface Area (cm^2) 0.75 cm^2 Wound Volume (cm^3) 0.08 cm^3 Change in Wound Size % 91.67 Condition of Base Granulation;Epithelializing Condition of Edges Open Assessment Red;Pink Tissue Type Percent Pink 10 Tissue Type Percent Red 90 Drainage Amount Small Drainage Color Serosanguinous Wound Odor None Ana-wound Assessment Edema Cleansing and Cleansing Agents  Normal saline Dressing Changed Changed/New

## 2020-08-14 NOTE — PROGRESS NOTES
Skateboard wound looks good and is healing we put pure call on it today and she will return next week for follow-up with Dr. Jodie Mclean Progress Note    Patient: Rupesh Bonilla MRN: [id number]  SSN: [id number]    YOB: 1945  Age: 66 y.o. Sex: female      Subjective:     Chief Complaint:  Rupesh Bonilla is a 66 y.o. WHITE OR  female who presents with L lower leg anterior wound of 2 months duration.     History of Present Illness:       Wound Caused By: acute injury due to skateboard injury  Associated Signs and Symptoms: Mild pain and drainage timing: Intermittent  Quality: Burning  Severity: 2/10  Modifying Factors: None  Current Wound Care: See nurses notes    Past Medical History:   Diagnosis Date    Chronic obstructive pulmonary disease ([address]     uses inhalers; pt states has no pulmonologist    Chronic pain     lower back    Colitis 2001 & 2014    pt states no problems now 2/1/16    Diverticulosis of colon (without mention of hemorrhage) 5/11/2015    Fibrocystic breast     bilat breasts    Glaucoma     uses eye drops    H/O echocardiogram 3/27/14    EF 55%-60%; mild pulmonary artery HTN    History of ischemic colitis 2001    Hyperlipemia 5/11/2015    pt states no problems now 2/1/16    Hypertension     managed w/med    Occlusion and stenosis of carotid artery without mention of cerebral infarction 5/11/2015    Osteoarthritis     Osteoarthrosis, unspecified whether generalized or localized, hand 5/11/2015    Osteoporosis, unspecified 5/11/2015    Post-operative nausea and vomiting     pt states hx of; pt states no current problems after left shoulder replacement    Proteinuria 5/11/2015    Sciatica of right side     takes lyrica    Shingles 1980    Status post total left knee replacement 2/24/2016    Syncope 1/2014    X1 episode    Tobacco use disorder 5/11/2015    1/2 ppd for 50 yrs    Vitamin D deficiency 5/11/2015    takes vitamin d supplement      Past Surgical History:   Procedure Laterality Date    HX APPENDECTOMY      HX BREAST BIOPSY Right     X2 benign biopsies    HX COLONOSCOPY  2014    HX HYSTERECTOMY  1976    due to endometriosis    HX KNEE REPLACEMENT Left 2016    HX SHOULDER REPLACEMENT Left 2014     Family History   Problem Relation Age of Onset    Heart Disease Father     Hypertension Father     Heart Attack Father     Alcohol abuse Mother     Cancer Mother         MOUTH,THROAT CA      Social History     Tobacco Use    Smoking status: Current Every Day Smoker     Packs/day: 1.00     Years: 50.00     Pack years: 50.00    Smokeless tobacco: Never Used   Substance Use Topics    Alcohol use: No       Prior to Admission medications    Medication Sig Start Date End Date Taking? Authorizing Provider   conjugated estrogens (Premarin) 0.625 mg tablet Take 1 Tab by mouth daily. 20   Jovan Hernandez MD   lisinopriL (PRINIVIL, ZESTRIL) 20 mg tablet Take 1 Tab by mouth daily. 20   Jovan Hernandez MD   OTHER This is to certify that Selina Jc  : 1945 has the following conditions: an inability to ordinarily walk one hundred feet nonstop without aggravating an existing medical condition, including increase of pain 7/15/20   Jovan Hernandez MD   fluticasone-umeclidinium-vilanterol (TRELEGY ELLIPTA) 100-62.5-25 mcg inhaler Take 1 Puff by inhalation daily. 20   Jovan Hernandez MD   budesonide-formoteroL [facility]) 160-4.5 mcg/actuation HFAA Take 2 Puffs by inhalation two (2) times a day. 20   Jovan Hernandez MD   meloxicam (MOBIC) 15 mg tablet Take 1 Tab by mouth daily. 3/25/20   ArvPARKER Paz   albuterol (VENTOLIN HFA) 90 mcg/actuation inhaler Take 1 Puff by inhalation every four (4) hours as needed for Wheezing.  And Wheezing 10/28/19   Jovan Hernandez MD   DISABLED PLACARD (DISABLED PLACARD) DMV An inability to ordinarily walk one hundred feet without aggravating an existing medical condition, including the increase of pain 19   Macy Johnson MD   ergocalciferol (VITAMIN D2) 50,000 unit capsule Take 1 Cap by mouth every seven (7) days. 5/10/18   Macy Johnson MD   OTHER This is to certify that Yoav Núñez  : 1945 has the following conditions: an inability to ordinarily walk one hundred feet nonstop without aggravating an existing medical condition, including increase of pain 5/10/18   Macy Johnson MD     No Known Allergies     Review of Systems:  A comprehensive review of systems was negative except for that written in the History of Present Illness. No results found for: HBA1C, WJA9GKZX, HGBE8, OXL1CPCE, XAC9CJRB     Immunization History   Administered Date(s) Administered    Influenza High Dose Vaccine PF 10/24/2018    Influenza Vaccine 2013, 2019    Influenza Vaccine (Tri) Adjuvanted 10/24/2018, 2019    Pneumococcal Polysaccharide (PPSV-23) 2018    TB Skin Test (PPD) Intradermal 2016    Tdap 2015       Body mass index is 20.44 kg/m². Counseling regarding nutrition done: No     Current medications:  Current Outpatient Medications   Medication Sig Dispense Refill    conjugated estrogens (Premarin) 0.625 mg tablet Take 1 Tab by mouth daily. 30 Tab 6    lisinopriL (PRINIVIL, ZESTRIL) 20 mg tablet Take 1 Tab by mouth daily. 90 Tab 3    OTHER This is to certify that Yoav Núñez  : 1945 has the following conditions: an inability to ordinarily walk one hundred feet nonstop without aggravating an existing medical condition, including increase of pain 1 Each 0    fluticasone-umeclidinium-vilanterol (TRELEGY ELLIPTA) 100-62.5-25 mcg inhaler Take 1 Puff by inhalation daily. 1 Inhaler 5    budesonide-formoteroL (SYMBICORT) 160-4.5 mcg/actuation HFAA Take 2 Puffs by inhalation two (2) times a day. 1 Inhaler 6    meloxicam (MOBIC) 15 mg tablet Take 1 Tab by mouth daily.  30 Tab 3    albuterol (VENTOLIN HFA) 90 mcg/actuation inhaler Take 1 Puff by inhalation every four (4) hours as needed for Wheezing. And Wheezing 1 Inhaler 6    DISABLED PLACARD (DISABLED PLACARD) DMV An inability to ordinarily walk one hundred feet without aggravating an existing medical condition, including the increase of pain 1 Each 0    ergocalciferol (VITAMIN D2) 50,000 unit capsule Take 1 Cap by mouth every seven (7) days. 12 Cap 3    OTHER This is to certify that Robbi Hendrickson  : 1945 has the following conditions: an inability to ordinarily walk one hundred feet nonstop without aggravating an existing medical condition, including increase of pain 1 Each 0         Objective:     Physical Exam:     Visit Vitals  /70 (BP 1 Location: Left arm, BP Patient Position: At rest;Sitting)   Pulse 79   Temp 97.5 °F (36.4 °C)   Resp 18   Ht 5' 9\" (1.753 m)   Wt 62.8 kg (138 lb 6.4 oz)   SpO2 96%   BMI 20.44 kg/m²       General: well developed, well nourished, pleasant , NAD. Hygiene good  Psych: cooperative. Pleasant. No anxiety or depression. Normal mood and affect. Neuro: alert and oriented to person/place/situation. Otherwise nonfocal.  Derm: Normal turgor for age, dry skin  HEENT: Normocephalic, atraumatic. EOMI. Conjunctiva clear. No scleral icterus. Neck: Normal range of motion. No masses. Chest: Good air entry bilaterally. Respirations nonlabored  Cardio: Normal heart sounds,no rubs, murmurs or gallops  Abdomen: Soft, nontender, nondistended, normoactive bowel sounds  Lower extremities: color normal; temperature normal. Hair growth is not present. Calves are supple, nontender, approximately equally sized in comparison. Capillary refill <3 sec            Ulcer Description:   Wound Leg lower Left; Anterior (Active)   Dressing Status Old drainage 20 1319   Dressing Type Open to air 20 1407   Non-staged Wound Description Full thickness 20 1319   Wound Length (cm) 1.5 cm 20 1319   Wound Width (cm) 0.5 cm 20 1319   Wound Depth (cm) 0.1 cm 08/14/20 1319   Wound Surface Area (cm^2) 0.75 cm^2 08/14/20 1319   Wound Volume (cm^3) 0.08 cm^3 08/14/20 1319   Change in Wound Size % 91.67 08/14/20 1319   Condition of Base Granulation;Epithelializing 08/14/20 1319   Condition of Edges Open 08/14/20 1319   Epithelialization (%) 10 07/31/20 1339   Assessment Red;Pink 08/14/20 1319   Tissue Type Percent Maroon/Purple 100 % 07/17/20 1407   Tissue Type Percent Pink 10 08/14/20 1319   Tissue Type Percent Red 90 08/14/20 1319   Drainage Amount Small 08/14/20 1319   Drainage Color Serosanguinous 08/14/20 1319   Wound Odor None 08/14/20 1319   Ana-wound Assessment Edema 08/14/20 1319   Margins Unattached edges 07/17/20 1407   Cleansing and Cleansing Agents  Normal saline 08/14/20 1319   Dressing Changed Changed/New 08/14/20 1319   Dressing Type Applied Packing;ABD pad;Gauze wrap (keyur) 07/17/20 1407   Number of days: 28       [REMOVED] Wound Shoulder Left (Removed)   Number of days: 2176       [REMOVED] Wound Knee Left (Removed)   Number of days: 5390       [REMOVED] Wound Leg Lower Right;Lateral (Removed)   Dressing Status  Clean, dry, and intact 07/31/20 1339   Dressing Type  Xeroform;ABD pad;Compression Wrap/Venous Stasis 07/17/20 1407   Non-Pressure Injury Full thickness (subcut/muscle) 07/31/20 1339   Wound Length (cm) 0 cm 07/31/20 1339   Wound Width (cm) 0 cm 07/31/20 1339   Wound Depth (cm) 0 07/31/20 1339   Wound Surface area (cm^2) 0 cm^2 07/31/20 1339   Change in Wound Size % 100 07/31/20 1339   Condition of Base Epithelializing 07/31/20 1339   Condition of Edges Open 07/17/20 1407   Epithelialization (%) 100 07/31/20 1339   Tissue Type Yellow; Red 07/17/20 1407   Tissue Type Percent Pink 100 07/31/20 1339   Tissue Type Percent Red 95 07/17/20 1407   Tissue Type Percent Yellow 5 07/17/20 1407   Drainage Amount  None 07/31/20 1339   Drainage Color Serosanguinous 07/17/20 1407   Wound Odor None 07/31/20 1339   Periwound Skin Condition Intact 07/31/20 1339   Cleansing and Cleansing Agents  Normal saline 07/31/20 1339   Dressing Type Applied Foam 07/31/20 1339   Number of days: 21         Data Review:   No results found for this or any previous visit (from the past 24 hour(s)). Assessment:     66 y.o. female with L lower leg anterior traumatic ulcer. Problem List  Date Reviewed: 10/28/2019          Codes Class Noted    Status post total left knee replacement ICD-10-CM: C99.398  ICD-9-CM: V43.65  2/24/2016        Osteoarthritis ICD-10-CM: M19.90  ICD-9-CM: 715.90  Unknown        Age-related osteoporosis without current pathological fracture ICD-10-CM: M81.0  ICD-9-CM: 733.01  5/11/2015        Primary osteoarthritis involving multiple joints ICD-10-CM: M89.49  ICD-9-CM: 715.98  5/11/2015        Vitamin D deficiency ICD-10-CM: E55.9  ICD-9-CM: 268.9  5/11/2015        Tobacco use disorder, continuous ICD-10-CM: F17.209  ICD-9-CM: 305.1  5/11/2015        Hyperlipemia ICD-10-CM: E78.5  ICD-9-CM: 272.4  5/11/2015        Occlusion and stenosis of carotid artery without mention of cerebral infarction ICD-10-CM: I65.29  ICD-9-CM: 433.10  5/11/2015        Diverticulosis of colon (without mention of hemorrhage) ICD-10-CM: K57.30  ICD-9-CM: 562.10  5/11/2015        Essential hypertension ICD-10-CM: I10  ICD-9-CM: 401.9  8/18/2014        COPD (chronic obstructive pulmonary disease) (HCC) (Chronic) ICD-10-CM: J44.9  ICD-9-CM: 496  8/18/2014        Glaucoma (Chronic) ICD-10-CM: H40.9  ICD-9-CM: 365.9  8/18/2014               Plan:     Orders Placed This Encounter    WOUND CARE, DRESSING CHANGE     Left leg  Cleanse wound and periwound with wound cleanser or normal saline. Purachol Plus (may substitute equivalent collagen):cut to approximate wound size, apply to wound bed. Secure with gauze and bordered foam.   Change 3x weekly.    Tubigrips bilaterally.      Follow up in wound center in 2 weeks.         Standing Status:   Standing     Number of Occurrences:   1        Patient understood and agrees with plan. Questions answered. Follow-up Information     Follow up With Specialties Details Why Contact Info    [address] In 2 weeks  [place] [facility]  [phone]             Any procedures done today in the [address] [facility] are documented in a separate note in [facility] and made part of this record by reference.      Dictated using voice recognition software; proofread, but unrecognized errors may exist.    Signed By: Krissy Ibrahim MD     August 14, 2020

## 2020-08-14 NOTE — DISCHARGE INSTRUCTIONS
Left leg  Cleanse wound and periwound with wound cleanser or normal saline. Purachol Plus (may substitute equivalent collagen):cut to approximate wound size, apply to wound bed. Secure with gauze and bordered foam.   Change 3x weekly. Tubigrips bilaterally.      Follow up in wound center in 2 weeks.

## 2020-08-14 NOTE — WOUND CARE
[address] [place] [address], [facility] [address] Phone: [phone] Fax: [phone] Patient: Lacy Price MRN: [id number]  SSN: [id number] YOB: 1945  Age: 66 y.o. Sex: female Return Appointment: 2 weeks with Geovany Saini MD 
 
Instructions:  
Left leg Cleanse wound and periwound with wound cleanser or normal saline. Purachol Plus (may substitute equivalent collagen):cut to approximate wound size, apply to wound bed. Secure with gauze and bordered foam.  
Change 3x weekly. Tubigrips bilaterally.  
  
Follow up in wound center in 2 weeks. Should you experience increased redness, swelling, pain, foul odor, size of wound(s), or have a temperature over 101 degrees please contact the [address] [place] [address] Road at [phone] or if after hours contact your primary care physician or go to the hospital emergency department. Signed By: Melany Valero RN August 14, 2020

## 2020-08-28 ENCOUNTER — HOSPITAL ENCOUNTER (OUTPATIENT)
Dept: WOUND CARE | Age: 75
Discharge: HOME | End: 2020-08-28
Attending: SURGERY
Payer: MEDICARE

## 2020-08-28 VITALS
DIASTOLIC BLOOD PRESSURE: 81 MMHG | TEMPERATURE: 98 F | HEIGHT: 69 IN | BODY MASS INDEX: 20.44 KG/M2 | RESPIRATION RATE: 18 BRPM | HEART RATE: 83 BPM | SYSTOLIC BLOOD PRESSURE: 139 MMHG | WEIGHT: 138 LBS

## 2020-08-28 PROCEDURE — 99213 OFFICE O/P EST LOW 20 MIN: CPT

## 2020-08-28 PROCEDURE — 99212 OFFICE O/P EST SF 10 MIN: CPT

## 2020-08-28 NOTE — PROGRESS NOTES
08/28/20 1318   Wound Leg lower Left; Anterior   Date First Assessed/Time First Assessed: 07/17/20 1408   Present on Hospital Admission: Yes  Primary Wound Type: (c) Other (comment)  Location: Leg lower  Wound Location Orientation: Left; Anterior   Dressing Status Clean, dry, and intact   Wound Length (cm) 0 cm   Wound Width (cm) 0 cm   Wound Depth (cm) 0 cm   Wound Surface Area (cm^2) 0 cm^2   Wound Volume (cm^3) 0 cm^3   Change in Wound Size % 100   Condition of Base Epithelializing   Epithelialization (%) 100   Drainage Amount None   Wound Odor None   Cleansing and Cleansing Agents  Normal saline

## 2020-08-28 NOTE — DISCHARGE INSTRUCTIONS
[address]  [address], [address]  Phone: [phone]  Fax: [phone]    Patient: Joelle Reardon MRN: [id number]  SSN: [id number]    YOB: 1945  Age: 66 y.o. Sex: female       Return Appointment: Discharge from wound center at this time. Instructions: Left leg  Wound is healed! Skin is very fragile. Patient to cover healed area with small piece of ABD pad and Coversite or bordered foam for protection only for the next week. Change dressing every other day. Patient may shower as previously. Discharge from wound center at this time. Should you experience increased redness, swelling, pain, foul odor, size of wound(s), or have a temperature over 101 degrees please contact the [address] [place] [address] Road at [phone] or if after hours contact your primary care physician or go to the hospital emergency department.     Signed By: Miladys Juarez, PT, [address] [facility]     August 28, 2020

## 2020-08-28 NOTE — WOUND CARE
[address] [place] [address], [address] Phone: [phone] Fax: [phone] Patient: Bright Martinez MRN: [id number]  SSN: [id number] YOB: 1945  Age: 66 y.o. Sex: female Return Appointment: Discharge from wound center at this time. Instructions: Left leg Wound is healed! Skin is very fragile. Patient to cover healed area with small piece of ABD pad and Coversite or bordered foam for protection only for the next week. Change dressing every other day. Patient may shower as previously. Discharge from wound center at this time. Should you experience increased redness, swelling, pain, foul odor, size of wound(s), or have a temperature over 101 degrees please contact the [address] [place] [address] Road at [phone] or if after hours contact your primary care physician or go to the hospital emergency department. Signed By: Ania Quintero, PT , [address] [facility] August 28, 2020

## 2020-09-04 NOTE — PROGRESS NOTES
Wound Center Progress Note    Patient: Elida Elena MRN: [id number]  SSN: [id number]    YOB: 1945  Age: 66 y.o. Sex: female      Subjective:     Chief Complaint:  RLE venous ulcer following fall  History of Present Illness:       Wound Caused By: fall, venous disease  Associated Signs and Symptoms: some drainage, pain  Timing: since June 2020  Quality: wound  Severity: full thickness    Previously treated at the wound center. Has not been routinely wearing her compression. + Smoker    DoingNo new issues with dressings. Minimal pain. No new wounds since last. Tolerating dressings.      Past Medical History:   Diagnosis Date    Chronic obstructive pulmonary disease ([address]     uses inhalers; pt states has no pulmonologist    Chronic pain     lower back    Colitis 2001 & 2014    pt states no problems now 2/1/16    Diverticulosis of colon (without mention of hemorrhage) 5/11/2015    Fibrocystic breast     bilat breasts    Glaucoma     uses eye drops    H/O echocardiogram 3/27/14    EF 55%-60%; mild pulmonary artery HTN    History of ischemic colitis 2001    Hyperlipemia 5/11/2015    pt states no problems now 2/1/16    Hypertension     managed w/med    Occlusion and stenosis of carotid artery without mention of cerebral infarction 5/11/2015    Osteoarthritis     Osteoarthrosis, unspecified whether generalized or localized, hand 5/11/2015    Osteoporosis, unspecified 5/11/2015    Post-operative nausea and vomiting     pt states hx of; pt states no current problems after left shoulder replacement    Proteinuria 5/11/2015    Sciatica of right side     takes lyrica    Shingles 1980    Status post total left knee replacement 2/24/2016    Syncope 1/2014    X1 episode    Tobacco use disorder 5/11/2015    1/2 ppd for 50 yrs    Vitamin D deficiency 5/11/2015    takes vitamin d supplement      Past Surgical History:   Procedure Laterality Date    HX APPENDECTOMY      HX BREAST BIOPSY Right     X2 benign biopsies    HX COLONOSCOPY      HX HYSTERECTOMY  1976    due to endometriosis    HX KNEE REPLACEMENT Left 2016    HX SHOULDER REPLACEMENT Left 2014     Family History   Problem Relation Age of Onset    Heart Disease Father     Hypertension Father     Heart Attack Father     Alcohol abuse Mother     Cancer Mother         MOUTH,THROAT CA      Social History     Tobacco Use    Smoking status: Current Every Day Smoker     Packs/day: 1.00     Years: 50.00     Pack years: 50.00    Smokeless tobacco: Never Used   Substance Use Topics    Alcohol use: No       Prior to Admission medications    Medication Sig Start Date End Date Taking? Authorizing Provider   conjugated estrogens (Premarin) 0.625 mg tablet Take 1 Tab by mouth daily. 20   Kia Abdul MD   lisinopriL (PRINIVIL, ZESTRIL) 20 mg tablet Take 1 Tab by mouth daily. 20   Kia Abdul MD   OTHER This is to certify that Chantale Waters  : 1945 has the following conditions: an inability to ordinarily walk one hundred feet nonstop without aggravating an existing medical condition, including increase of pain 7/15/20   Kia Abdul MD   fluticasone-umeclidinium-vilanterol (TRELEGY ELLIPTA) 100-62.5-25 mcg inhaler Take 1 Puff by inhalation daily. 20   Kia Abdul MD   budesonide-formoteroL [facility]) 160-4.5 mcg/actuation HFAA Take 2 Puffs by inhalation two (2) times a day. 20   Kia Abdul MD   meloxicam (MOBIC) 15 mg tablet Take 1 Tab by mouth daily. 3/25/20   PARKER Garcias   albuterol (VENTOLIN HFA) 90 mcg/actuation inhaler Take 1 Puff by inhalation every four (4) hours as needed for Wheezing.  And Wheezing 10/28/19   Kia Abdul MD   DISABLED PLACARD (DISABLED PLACARD) DMV An inability to ordinarily walk one hundred feet without aggravating an existing medical condition, including the increase of pain 19   Kia Abdul MD   OTHER This is to certify that [facility]   : 1945 has the following conditions: an inability to ordinarily walk one hundred feet nonstop without aggravating an existing medical condition, including increase of pain 5/10/18   Jacob Evans MD     No Known Allergies     Review of Systems:  CONSTITUTIONAL: No fever, chills  HEAD: No headache  EYES: No visual loss  ENT: No hearing loss  SKIN: No rash  CARDIOVASCULAR: No chest pain  RESPIRATORY: No shortness of breath  GASTROINTESTINAL: No nausea, vomiting  GENITOURINARY: No excessive urination  NEUROLOGICAL: No weakness  MUSCULOSKELETAL: No muscle pain. No neck pain  HEMATOLOGIC: No easy bleeding  LYMPHATICS: No lymphedema. PSYCHIATRIC: No current depression  ENDOCRINOLOGIC: No high sugars  ALLERGIES: No history of asthma, hives, eczema or rhinitis. No results found for: HBA1C, ZYR3SUYW, HGBE8, WWV3XPLA, INM0GBJN, MGG6TBIE     Immunization History   Administered Date(s) Administered    Influenza High Dose Vaccine PF 10/24/2018, 2020    Influenza Vaccine 2013, 2019    Influenza Vaccine (Tri) Adjuvanted 10/24/2018, 2019    Pneumococcal Polysaccharide (PPSV-23) 2018    TB Skin Test (PPD) Intradermal 2016    Tdap 2015       Body mass index is 20.38 kg/m². Current medications:  Current Outpatient Medications   Medication Sig Dispense Refill    conjugated estrogens (Premarin) 0.625 mg tablet Take 1 Tab by mouth daily. 30 Tab 6    lisinopriL (PRINIVIL, ZESTRIL) 20 mg tablet Take 1 Tab by mouth daily. 90 Tab 3    OTHER This is to certify that [facility]   : 1945 has the following conditions: an inability to ordinarily walk one hundred feet nonstop without aggravating an existing medical condition, including increase of pain 1 Each 0    fluticasone-umeclidinium-vilanterol (TRELEGY ELLIPTA) 100-62.5-25 mcg inhaler Take 1 Puff by inhalation daily.  1 Inhaler 5    budesonide-formoteroL (SYMBICORT) 160-4.5 mcg/actuation HFAA Take 2 Puffs by inhalation two (2) times a day. 1 Inhaler 6    meloxicam (MOBIC) 15 mg tablet Take 1 Tab by mouth daily. 30 Tab 3    albuterol (VENTOLIN HFA) 90 mcg/actuation inhaler Take 1 Puff by inhalation every four (4) hours as needed for Wheezing. And Wheezing 1 Inhaler 6    DISABLED PLACARD (DISABLED PLACARD) DMV An inability to ordinarily walk one hundred feet without aggravating an existing medical condition, including the increase of pain 1 Each 0    OTHER This is to certify that Zelalem Laura  : 1945 has the following conditions: an inability to ordinarily walk one hundred feet nonstop without aggravating an existing medical condition, including increase of pain 1 Each 0         Objective:     Physical Exam:     Visit Vitals  /81 (BP 1 Location: Right arm, BP Patient Position: At rest)   Pulse 83   Temp 98 °F (36.7 °C)   Resp 18   Ht 5' 9\" (1.753 m)   Wt 62.6 kg (138 lb)   BMI 20.38 kg/m²       General: well developed thin but overall healthy appearing  Psych: cooperative. Pleasant  Neuro: alert and oriented to person/place/situation. Otherwise nonfocal.  Derm: Normal turgor for age, dry skin  HEENT: Normocephalic, atraumatic. EOMI. Neck: Normal range of motion.   Chest: Respirations nonlabored  Cardio: RRR  Abdomen: Soft, nondistended  Lower extremities:   + hemosiderrosis  + large and small vessel varicosities  Capillary refill <3 sec    Right  2+ DP/PT    Left  2+ DP/PT                    Ulcer Description:   [REMOVED] Wound Shoulder Left (Removed)   Number of days: 2176       [REMOVED] Wound Knee Left (Removed)   Number of days: 9526       [REMOVED] Wound Leg Lower Right;Lateral (Removed)   Dressing Status  Clean, dry, and intact 20 1339   Dressing Type  Xeroform;ABD pad;Compression Wrap/Venous Stasis 20 1407   Non-Pressure Injury Full thickness (subcut/muscle) 20 1339   Wound Length (cm) 0 cm 20 1339   Wound Width (cm) 0 cm 20 1339   Wound Depth (cm) 0 07/31/20 1339   Wound Surface area (cm^2) 0 cm^2 07/31/20 1339   Change in Wound Size % 100 07/31/20 1339   Condition of Base Epithelializing 07/31/20 1339   Condition of Edges Open 07/17/20 1407   Epithelialization (%) 100 07/31/20 1339   Tissue Type Yellow; Red 07/17/20 1407   Tissue Type Percent Pink 100 07/31/20 1339   Tissue Type Percent Red 95 07/17/20 1407   Tissue Type Percent Yellow 5 07/17/20 1407   Drainage Amount  None 07/31/20 1339   Drainage Color Serosanguinous 07/17/20 1407   Wound Odor None 07/31/20 1339   Periwound Skin Condition Intact 07/31/20 1339   Cleansing and Cleansing Agents  Normal saline 07/31/20 1339   Dressing Type Applied Foam 07/31/20 1339   Number of days: 21       [REMOVED] Wound Leg lower Left; Anterior (Removed)   Dressing Status Clean, dry, and intact 08/28/20 1318   Dressing Type Open to air 07/17/20 1407   Non-staged Wound Description Full thickness 08/14/20 1319   Wound Length (cm) 0 cm 08/28/20 1318   Wound Width (cm) 0 cm 08/28/20 1318   Wound Depth (cm) 0 cm 08/28/20 1318   Wound Surface Area (cm^2) 0 cm^2 08/28/20 1318   Wound Volume (cm^3) 0 cm^3 08/28/20 1318   Change in Wound Size % 100 08/28/20 1318   Condition of Base Epithelializing 08/28/20 1318   Condition of Edges Open 08/14/20 1319   Epithelialization (%) 100 08/28/20 1318   Assessment Red;Pink 08/14/20 1319   Tissue Type Percent Maroon/Purple 100 % 07/17/20 1407   Tissue Type Percent Pink 10 08/14/20 1319   Tissue Type Percent Red 90 08/14/20 1319   Drainage Amount None 08/28/20 1318   Drainage Color Serosanguinous 08/14/20 1319   Wound Odor None 08/28/20 1318   Ana-wound Assessment Edema 08/14/20 1319   Margins Unattached edges 07/17/20 1407   Cleansing and Cleansing Agents  Normal saline 08/28/20 1318   Dressing Changed Changed/New 08/14/20 1319   Dressing Type Applied Packing;ABD pad;Gauze wrap (keyur) 07/17/20 1407   Number of days: 42           Problem List  Date Reviewed: 10/28/2019          Codes Class Noted    Status post total left knee replacement ICD-10-CM: H79.512  ICD-9-CM: V43.65  2/24/2016        Osteoarthritis ICD-10-CM: M19.90  ICD-9-CM: 715.90  Unknown        Age-related osteoporosis without current pathological fracture ICD-10-CM: M81.0  ICD-9-CM: 733.01  5/11/2015        Primary osteoarthritis involving multiple joints ICD-10-CM: M89.49  ICD-9-CM: 715.98  5/11/2015        Vitamin D deficiency ICD-10-CM: E55.9  ICD-9-CM: 268.9  5/11/2015        Tobacco use disorder, continuous ICD-10-CM: F17.209  ICD-9-CM: 305.1  5/11/2015        Hyperlipemia ICD-10-CM: E78.5  ICD-9-CM: 272.4  5/11/2015        Occlusion and stenosis of carotid artery without mention of cerebral infarction ICD-10-CM: I65.29  ICD-9-CM: 433.10  5/11/2015        Diverticulosis of colon (without mention of hemorrhage) ICD-10-CM: K57.30  ICD-9-CM: 562.10  5/11/2015        Essential hypertension ICD-10-CM: I10  ICD-9-CM: 401.9  8/18/2014        COPD (chronic obstructive pulmonary disease) (HCC) (Chronic) ICD-10-CM: J44.9  ICD-9-CM: 496  8/18/2014        Glaucoma (Chronic) ICD-10-CM: H40.9  ICD-9-CM: 365.9  8/18/2014                    Assessment/Plan:     Venous ulcer initiated by trauma. Granulation improving and almost resolved. Continue physician directed multilayer compression. Will change to collagen. Discussed smoking cessation.     Signed By: Domenic De La Cruz MD

## 2020-09-11 ENCOUNTER — HOSPITAL ENCOUNTER (OUTPATIENT)
Dept: WOUND CARE | Age: 75
Discharge: HOME | End: 2020-09-11
Attending: SURGERY
Payer: MEDICARE

## 2020-09-11 VITALS
HEIGHT: 69 IN | WEIGHT: 138.4 LBS | SYSTOLIC BLOOD PRESSURE: 150 MMHG | RESPIRATION RATE: 18 BRPM | HEART RATE: 91 BPM | TEMPERATURE: 97.8 F | DIASTOLIC BLOOD PRESSURE: 66 MMHG | BODY MASS INDEX: 20.5 KG/M2

## 2020-09-11 PROCEDURE — 99212 OFFICE O/P EST SF 10 MIN: CPT

## 2020-09-11 NOTE — WOUND CARE
09/11/20 1540 Wound Knee Anterior;Right #1 right knee 09/11/20 Date First Assessed/Time First Assessed: 09/11/20 1539   Present on Hospital Admission: Yes  Wound Approximate Age at First Assessment (Weeks): 1 weeks  Primary Wound Type: Skin Tear  Location: Knee  Wound Location Orientation: Anterior;Right  Wound D... Dressing Status Clean, dry, and intact Dressing Type  
(xeroform, bandaid) Non-staged Wound Description Full thickness Wound Length (cm) 3 cm Wound Width (cm) 2.1 cm Wound Depth (cm) 0.1 cm Wound Surface Area (cm^2) 6.3 cm^2 Wound Volume (cm^3) 0.63 cm^3 Tissue Type Percent Red 100 Drainage Amount Small Drainage Color Serosanguinous Wound Odor None Ana-wound Assessment Purple Dressing Changed Changed/New Wound Arm lower Anterior;Right #2 right forearm 09/11/20 Date First Assessed/Time First Assessed: 09/11/20 1540   Present on Hospital Admission: Yes  Wound Approximate Age at First Assessment (Weeks): 1 weeks  Primary Wound Type: Skin Tear  Location: Arm lower  Wound Location Orientation: Anterior;Right  Wo. .. Dressing Status Clean, dry, and intact Dressing Type  
(xeroform, abd, gauze) Non-staged Wound Description Full thickness Wound Length (cm) 7.5 cm Wound Width (cm) 3 cm Wound Depth (cm) 0.1 cm Wound Surface Area (cm^2) 22.5 cm^2 Wound Volume (cm^3) 2.25 cm^3 Tissue Type Percent Red 100 Drainage Amount Small Drainage Color Serosanguinous Wound Odor None Cleansing and Cleansing Agents  Normal saline Dressing Changed Changed/New Wounds mechanically debrided with gauze and normal saline.

## 2020-09-11 NOTE — DISCHARGE INSTRUCTIONS
[address]  [address], [address]  Phone: [phone]  Fax: [phone]    Patient: Joelle Reardon MRN: [id number]  SSN: [id number]    YOB: 1945  Age: 66 y.o. Sex: female       Return Appointment: 2 weeks with Gerhard Rodriguez MD    Instructions: Right forearm, Right knee  Cleanse wound and periwound with wound cleanser or normal saline. Xeroform- apply to wound bed. Secure with border foam.  Change every 2 to 3 days as needed. Follow up in wound center in 2 weeks. Should you experience increased redness, swelling, pain, foul odor, size of wound(s), or have a temperature over 101 degrees please contact the [address] [place] [address] Road at [phone] or if after hours contact your primary care physician or go to the hospital emergency department.     Signed By: Luis Daniel Green     September 11, 2020

## 2020-09-11 NOTE — WOUND CARE
[address] [place] [address], [address] [address] Phone: [phone] Fax: [phone] Patient: Sarahi Sellers MRN: [id number]  SSN: [id number] YOB: 1945  Age: 66 y.o. Sex: female Return Appointment: 2 weeks with July Bill MD 
 
Instructions: Right forearm, Right knee Cleanse wound and periwound with wound cleanser or normal saline. Xeroform- apply to wound bed. Secure with border foam. 
Change every 2 to 3 days as needed. Follow up in wound center in 2 weeks. Should you experience increased redness, swelling, pain, foul odor, size of wound(s), or have a temperature over 101 degrees please contact the [address] [place] [address] Road at [phone] or if after hours contact your primary care physician or go to the hospital emergency department. Signed By: Symone Kessler September 11, 2020

## 2020-09-18 NOTE — PROGRESS NOTES
Wound Center Progress Note    Patient: Rupesh Bonilla MRN: [id number]  SSN: [id number]    YOB: 1945  Age: 66 y.o. Sex: female      Subjective:     Chief Complaint:  R forearm and knee wounds following fall  History of Present Illness:       Wound Caused By: fall, venous disease  Associated Signs and Symptoms: some drainage, pain  Timing: since June 2020  Quality: wound  Severity: full thickness    Previously treated at the wound center. For right leg ulcer. Recent fall. Struck knee and forearm. Did not seek care at the ER.      Past Medical History:   Diagnosis Date    Chronic obstructive pulmonary disease ([address]     uses inhalers; pt states has no pulmonologist    Chronic pain     lower back    Colitis 2001 & 2014    pt states no problems now 2/1/16    Diverticulosis of colon (without mention of hemorrhage) 5/11/2015    Fibrocystic breast     bilat breasts    Glaucoma     uses eye drops    H/O echocardiogram 3/27/14    EF 55%-60%; mild pulmonary artery HTN    History of ischemic colitis 2001    Hyperlipemia 5/11/2015    pt states no problems now 2/1/16    Hypertension     managed w/med    Occlusion and stenosis of carotid artery without mention of cerebral infarction 5/11/2015    Osteoarthritis     Osteoarthrosis, unspecified whether generalized or localized, hand 5/11/2015    Osteoporosis, unspecified 5/11/2015    Post-operative nausea and vomiting     pt states hx of; pt states no current problems after left shoulder replacement    Proteinuria 5/11/2015    Sciatica of right side     takes lyrica    Shingles 1980    Status post total left knee replacement 2/24/2016    Syncope 1/2014    X1 episode    Tobacco use disorder 5/11/2015    1/2 ppd for 50 yrs    Vitamin D deficiency 5/11/2015    takes vitamin d supplement      Past Surgical History:   Procedure Laterality Date    HX APPENDECTOMY      HX BREAST BIOPSY Right     X2 benign biopsies    HX COLONOSCOPY  2014  HX HYSTERECTOMY  1976    due to endometriosis    HX KNEE REPLACEMENT Left 2016    HX SHOULDER REPLACEMENT Left 2014     Family History   Problem Relation Age of Onset    Heart Disease Father     Hypertension Father     Heart Attack Father     Alcohol abuse Mother     Cancer Mother         MOUTH,THROAT CA      Social History     Tobacco Use    Smoking status: Current Every Day Smoker     Packs/day: 1.00     Years: 50.00     Pack years: 50.00    Smokeless tobacco: Never Used   Substance Use Topics    Alcohol use: No       Prior to Admission medications    Medication Sig Start Date End Date Taking? Authorizing Provider   conjugated estrogens (Premarin) 0.625 mg tablet Take 1 Tab by mouth daily. 20  Yes Ghassan Armstrong MD   lisinopriL (PRINIVIL, ZESTRIL) 20 mg tablet Take 1 Tab by mouth daily. 20  Yes Ghassan Armstrong MD   fluticasone-umeclidinium-vilanterol (TRELEGY ELLIPTA) 100-62.5-25 mcg inhaler Take 1 Puff by inhalation daily. 20  Yes Ghassan Armstrong MD   budesonide-formoteroL (SYMBICORT) 160-4.5 mcg/actuation HFAA Take 2 Puffs by inhalation two (2) times a day. 20  Yes Ghassan Armstrong MD   meloxicam (MOBIC) 15 mg tablet Take 1 Tab by mouth daily. 3/25/20  Yes Lynnda Fillers, FNP   albuterol (VENTOLIN HFA) 90 mcg/actuation inhaler Take 1 Puff by inhalation every four (4) hours as needed for Wheezing.  And Wheezing 10/28/19  Yes Ghassan Armstrong MD   OTHER This is to certify that Perez NORTH: 1945 has the following conditions: an inability to ordinarily walk one hundred feet nonstop without aggravating an existing medical condition, including increase of pain 7/15/20   Ghassan Armstrong MD   DISABLED PLACARD (DISABLED PLACARD) DMV An inability to ordinarily walk one hundred feet without aggravating an existing medical condition, including the increase of pain 19   Ghassan Armstrong MD   OTHER This is to certify that Perez NORTH: 1945 has the following conditions: an inability to ordinarily walk one hundred feet nonstop without aggravating an existing medical condition, including increase of pain 5/10/18   Macy Johnson MD     No Known Allergies     Review of Systems:  CONSTITUTIONAL: No fever, chills  HEAD: No headache  EYES: No visual loss  ENT: No hearing loss  SKIN: No rash  CARDIOVASCULAR: No chest pain  RESPIRATORY: No shortness of breath  GASTROINTESTINAL: No nausea, vomiting  GENITOURINARY: No excessive urination  NEUROLOGICAL: No weakness  MUSCULOSKELETAL: No muscle pain. No neck pain  HEMATOLOGIC: No easy bleeding  LYMPHATICS: No lymphedema. PSYCHIATRIC: No current depression  ENDOCRINOLOGIC: No high sugars  ALLERGIES: No history of asthma, hives, eczema or rhinitis. No results found for: HBA1C, GHO6QJEL, HGBE8, XJS8MPJC, QIG8ZTQY, EUF5DZUL     Immunization History   Administered Date(s) Administered    Influenza High Dose Vaccine PF 10/24/2018, 08/26/2020    Influenza Vaccine 11/04/2013, 09/25/2019    Influenza Vaccine (Tri) Adjuvanted 10/24/2018, 09/25/2019    Pneumococcal Polysaccharide (PPSV-23) 08/06/2018    TB Skin Test (PPD) Intradermal 02/24/2016    Tdap 07/09/2015       Body mass index is 20.44 kg/m². Current medications:  Current Outpatient Medications   Medication Sig Dispense Refill    conjugated estrogens (Premarin) 0.625 mg tablet Take 1 Tab by mouth daily. 30 Tab 6    lisinopriL (PRINIVIL, ZESTRIL) 20 mg tablet Take 1 Tab by mouth daily. 90 Tab 3    fluticasone-umeclidinium-vilanterol (TRELEGY ELLIPTA) 100-62.5-25 mcg inhaler Take 1 Puff by inhalation daily. 1 Inhaler 5    budesonide-formoteroL (SYMBICORT) 160-4.5 mcg/actuation HFAA Take 2 Puffs by inhalation two (2) times a day. 1 Inhaler 6    meloxicam (MOBIC) 15 mg tablet Take 1 Tab by mouth daily. 30 Tab 3    albuterol (VENTOLIN HFA) 90 mcg/actuation inhaler Take 1 Puff by inhalation every four (4) hours as needed for Wheezing.  And Wheezing 1 Inhaler 6  OTHER This is to certify that Zenobia Grier  : 1945 has the following conditions: an inability to ordinarily walk one hundred feet nonstop without aggravating an existing medical condition, including increase of pain 1 Each 0    DISABLED PLACARD (DISABLED PLACARD) DMV An inability to ordinarily walk one hundred feet without aggravating an existing medical condition, including the increase of pain 1 Each 0    OTHER This is to certify that Zenobia Grier  : 1945 has the following conditions: an inability to ordinarily walk one hundred feet nonstop without aggravating an existing medical condition, including increase of pain 1 Each 0         Objective:     Physical Exam:     Visit Vitals  BP (!) 150/66 (BP 1 Location: Left arm, BP Patient Position: Sitting)   Pulse 91   Temp 97.8 °F (36.6 °C)   Resp 18   Ht 5' 9\" (1.753 m)   Wt 62.8 kg (138 lb 6.4 oz)   BMI 20.44 kg/m²       General: well developed thin but overall healthy appearing  Psych: cooperative. Pleasant  Neuro: alert and oriented to person/place/situation. Otherwise nonfocal.  Derm: Normal turgor for age, dry skin  HEENT: Normocephalic, atraumatic. EOMI. Neck: Normal range of motion.   Chest: Respirations nonlabored  Cardio: RRR  Abdomen: Soft, nondistended  Lower extremities:   + hemosiderrosis  + large and small vessel varicosities  Capillary refill <3 sec    Right  2+ DP/PT    Left  2+ DP/PT                            Ulcer Description:   Wound Knee Anterior;Right #1 right knee 20 (Active)   Dressing Status Clean, dry, and intact 20 1540   Non-staged Wound Description Full thickness 20 1540   Wound Length (cm) 3 cm 20 1540   Wound Width (cm) 2.1 cm 20 1540   Wound Depth (cm) 0.1 cm 20 1540   Wound Surface Area (cm^2) 6.3 cm^2 20 1540   Wound Volume (cm^3) 0.63 cm^3 20 1540   Tissue Type Percent Red 100 20 1540   Drainage Amount Small 20 1540   Drainage Color Serosanguinous 09/11/20 1540   Wound Odor None 09/11/20 1540   Ana-wound Assessment Purple 09/11/20 1540   Dressing Changed Changed/New 09/11/20 1540   Number of days: 7       Wound Arm lower Anterior;Right #2 right forearm 09/11/20 (Active)   Dressing Status Clean, dry, and intact 09/11/20 1540   Non-staged Wound Description Full thickness 09/11/20 1540   Wound Length (cm) 7.5 cm 09/11/20 1540   Wound Width (cm) 3 cm 09/11/20 1540   Wound Depth (cm) 0.1 cm 09/11/20 1540   Wound Surface Area (cm^2) 22.5 cm^2 09/11/20 1540   Wound Volume (cm^3) 2.25 cm^3 09/11/20 1540   Tissue Type Percent Red 100 09/11/20 1540   Drainage Amount Small 09/11/20 1540   Drainage Color Serosanguinous 09/11/20 1540   Wound Odor None 09/11/20 1540   Cleansing and Cleansing Agents  Normal saline 09/11/20 1540   Dressing Changed Changed/New 09/11/20 1540   Number of days: 7       [REMOVED] Wound Shoulder Left (Removed)   Number of days: 2176       [REMOVED] Wound Knee Left (Removed)   Number of days: 1002       [REMOVED] Wound Leg Lower Right;Lateral (Removed)   Dressing Status  Clean, dry, and intact 07/31/20 1339   Dressing Type  Xeroform;ABD pad;Compression Wrap/Venous Stasis 07/17/20 1407   Non-Pressure Injury Full thickness (subcut/muscle) 07/31/20 1339   Wound Length (cm) 0 cm 07/31/20 1339   Wound Width (cm) 0 cm 07/31/20 1339   Wound Depth (cm) 0 07/31/20 1339   Wound Surface area (cm^2) 0 cm^2 07/31/20 1339   Change in Wound Size % 100 07/31/20 1339   Condition of Base Epithelializing 07/31/20 1339   Condition of Edges Open 07/17/20 1407   Epithelialization (%) 100 07/31/20 1339   Tissue Type Yellow; Red 07/17/20 1407   Tissue Type Percent Pink 100 07/31/20 1339   Tissue Type Percent Red 95 07/17/20 1407   Tissue Type Percent Yellow 5 07/17/20 1407   Drainage Amount  None 07/31/20 1339   Drainage Color Serosanguinous 07/17/20 1407   Wound Odor None 07/31/20 1339   Periwound Skin Condition Intact 07/31/20 1339   Cleansing and Cleansing Agents Normal saline 07/31/20 1339   Dressing Type Applied Foam 07/31/20 1339   Number of days: 21       [REMOVED] Wound Leg lower Left; Anterior (Removed)   Dressing Status Clean, dry, and intact 08/28/20 1318   Dressing Type Open to air 07/17/20 1407   Non-staged Wound Description Full thickness 08/14/20 1319   Wound Length (cm) 0 cm 08/28/20 1318   Wound Width (cm) 0 cm 08/28/20 1318   Wound Depth (cm) 0 cm 08/28/20 1318   Wound Surface Area (cm^2) 0 cm^2 08/28/20 1318   Wound Volume (cm^3) 0 cm^3 08/28/20 1318   Change in Wound Size % 100 08/28/20 1318   Condition of Base Epithelializing 08/28/20 1318   Condition of Edges Open 08/14/20 1319   Epithelialization (%) 100 08/28/20 1318   Assessment Red;Pink 08/14/20 1319   Tissue Type Percent Maroon/Purple 100 % 07/17/20 1407   Tissue Type Percent Pink 10 08/14/20 1319   Tissue Type Percent Red 90 08/14/20 1319   Drainage Amount None 08/28/20 1318   Drainage Color Serosanguinous 08/14/20 1319   Wound Odor None 08/28/20 1318   Ana-wound Assessment Edema 08/14/20 1319   Margins Unattached edges 07/17/20 1407   Cleansing and Cleansing Agents  Normal saline 08/28/20 1318   Dressing Changed Changed/New 08/14/20 1319   Dressing Type Applied Packing;ABD pad;Gauze wrap (keyur) 07/17/20 1407   Number of days: 42           Problem List  Date Reviewed: 10/28/2019          Codes Class Noted    Status post total left knee replacement ICD-10-CM: K49.747  ICD-9-CM: V43.65  2/24/2016        Osteoarthritis ICD-10-CM: M19.90  ICD-9-CM: 715.90  Unknown        Age-related osteoporosis without current pathological fracture ICD-10-CM: M81.0  ICD-9-CM: 733.01  5/11/2015        Primary osteoarthritis involving multiple joints ICD-10-CM: M89.49  ICD-9-CM: 715.98  5/11/2015        Vitamin D deficiency ICD-10-CM: E55.9  ICD-9-CM: 268.9  5/11/2015        Tobacco use disorder, continuous ICD-10-CM: F17.209  ICD-9-CM: 305.1  5/11/2015        Hyperlipemia ICD-10-CM: E78.5  ICD-9-CM: 272.4  5/11/2015 Occlusion and stenosis of carotid artery without mention of cerebral infarction ICD-10-CM: I65.29  ICD-9-CM: 433.10  5/11/2015        Diverticulosis of colon (without mention of hemorrhage) ICD-10-CM: K57.30  ICD-9-CM: 562.10  5/11/2015        Essential hypertension ICD-10-CM: I10  ICD-9-CM: 401.9  8/18/2014        COPD (chronic obstructive pulmonary disease) (HCC) (Chronic) ICD-10-CM: J44.9  ICD-9-CM: 496  8/18/2014        Glaucoma (Chronic) ICD-10-CM: H40.9  ICD-9-CM: 365.9  8/18/2014                    Assessment/Plan:     Start dressing changes with xeroform. Avoid any trauma. Recheck in 2 weeks. Discussed smoking cessation.     Signed By: Korin Wu MD

## 2020-09-25 ENCOUNTER — HOSPITAL ENCOUNTER (OUTPATIENT)
Dept: WOUND CARE | Age: 75
Discharge: HOME | End: 2020-09-25
Attending: SURGERY
Payer: MEDICARE

## 2020-09-25 VITALS — BODY MASS INDEX: 21.06 KG/M2 | TEMPERATURE: 97.5 F | HEIGHT: 69 IN | WEIGHT: 142.2 LBS

## 2020-09-25 PROCEDURE — 99213 OFFICE O/P EST LOW 20 MIN: CPT

## 2020-09-25 NOTE — WOUND CARE
[address]  [address], [address]   Phone: [phone]  Fax: [phone]    Patient: Tari Claudio MRN: [id number]  SSN: [id number]    YOB: 1945  Age: 66 y.o. Sex: female       Return Appointment: 2 weeks with Deon Jaquez MD    Instructions:   Right forearm, Right knee  Cleanse wound and periwound with wound cleanser or normal saline. Xeroform- apply to wound bed. Secure with border foam.  Change every 2 to 3 days as needed.      Follow up in wound center in 2 weeks. Should you experience increased redness, swelling, pain, foul odor, size of wound(s), or have a temperature over 101 degrees please contact the [address] [place] [address] Road at [phone] or if after hours contact your primary care physician or go to the hospital emergency department.     Signed By: Lary Mcmullen RN     September 25, 2020

## 2020-09-25 NOTE — WOUND CARE
09/25/20 1314   Wound Knee Anterior;Right #1 right knee 09/11/20   Date First Assessed/Time First Assessed: 09/11/20 1539   Present on Hospital Admission: Yes  Wound Approximate Age at First Assessment (Weeks): 1 weeks  Primary Wound Type: Skin Tear  Location: Knee  Wound Location Orientation: Anterior;Right  Wound D... Dressing Status Old drainage   Dressing Type   (xeroform, gauze, bordered foam)   Non-staged Wound Description Full thickness   Wound Length (cm) 2.5 cm   Wound Width (cm) 1.6 cm   Wound Depth (cm) 0.1 cm   Wound Surface Area (cm^2) 4 cm^2   Wound Volume (cm^3) 0.4 cm^3   Change in Wound Size % 36.51   Condition of Base Granulation;Slough   Assessment Pink   Tissue Type Percent Red 90   Tissue Type Percent Yellow 10   Drainage Amount Small   Drainage Color Serosanguinous   Wound Odor None   Ana-wound Assessment Blanchable erythema;Painful   Cleansing and Cleansing Agents  Normal saline   Dressing Changed Changed/New   Wound Arm lower Anterior;Right #2 right forearm 09/11/20   Date First Assessed/Time First Assessed: 09/11/20 1540   Present on Hospital Admission: Yes  Wound Approximate Age at First Assessment (Weeks): 1 weeks  Primary Wound Type: Skin Tear  Location: Arm lower  Wound Location Orientation: Anterior;Right  Wo. ..    Dressing Status Old drainage   Dressing Type   (xeroform, gauze, bordered foam)   Non-staged Wound Description Partial thickness   Wound Length (cm) 7 cm   Wound Width (cm) 2.5 cm   Wound Depth (cm) 0.1 cm   Wound Surface Area (cm^2) 17.5 cm^2   Wound Volume (cm^3) 1.75 cm^3   Change in Wound Size % 22.22   Condition of Base Granulation;Epithelializing   Epithelialization (%) 25   Assessment Pink;Painful   Tissue Type Percent Pink 50   Tissue Type Percent Red 50   Drainage Amount Small   Drainage Color Serosanguinous   Wound Odor None   Ana-wound Assessment Intact   Cleansing and Cleansing Agents  Normal saline   Dressing Changed Changed/New           Patient is not taking an anticoagulant. Wounds mechanically debrided with saline and gauze.

## 2020-09-25 NOTE — DISCHARGE INSTRUCTIONS
Right forearm, Right knee  Cleanse wound and periwound with wound cleanser or normal saline. Xeroform- apply to wound bed.   Secure with border foam.  Change every 2 to 3 days as needed.      Follow up in wound center in 2 weeks.

## 2020-10-02 NOTE — PROGRESS NOTES
Wound Center Progress Note    Patient: Bright Martinez MRN: [id number]  SSN: [id number]    YOB: 1945  Age: 66 y.o. Sex: female      Subjective:     Chief Complaint:  R forearm and knee wounds following fall  History of Present Illness:       Wound Caused By: fall, venous disease  Associated Signs and Symptoms: some drainage, pain  Timing: since June 2020  Quality: wound  Severity: full thickness    Previously treated at the wound center. For right leg ulcer. Recent fall. Struck knee and forearm. Did not seek care at the ER. Continued pain in right knee but generalized and not located specifically at the joint. No new issues at the forearm.      Past Medical History:   Diagnosis Date    Chronic obstructive pulmonary disease ([facility] [address]     uses inhalers; pt states has no pulmonologist    Chronic pain     lower back    Colitis 2001 & 2014    pt states no problems now 2/1/16    Diverticulosis of colon (without mention of hemorrhage) 5/11/2015    Fibrocystic breast     bilat breasts    Glaucoma     uses eye drops    H/O echocardiogram 3/27/14    EF 55%-60%; mild pulmonary artery HTN    History of ischemic colitis 2001    Hyperlipemia 5/11/2015    pt states no problems now 2/1/16    Hypertension     managed w/med    Occlusion and stenosis of carotid artery without mention of cerebral infarction 5/11/2015    Osteoarthritis     Osteoarthrosis, unspecified whether generalized or localized, hand 5/11/2015    Osteoporosis, unspecified 5/11/2015    Post-operative nausea and vomiting     pt states hx of; pt states no current problems after left shoulder replacement    Proteinuria 5/11/2015    Sciatica of right side     takes lyrica    Shingles 1980    Status post total left knee replacement 2/24/2016    Syncope 1/2014    X1 episode    Tobacco use disorder 5/11/2015    1/2 ppd for 50 yrs    Vitamin D deficiency 5/11/2015    takes vitamin d supplement      Past Surgical History: Procedure Laterality Date    HX APPENDECTOMY      HX BREAST BIOPSY Right     X2 benign biopsies    HX COLONOSCOPY      HX HYSTERECTOMY  1976    due to endometriosis    HX KNEE REPLACEMENT Left 2016    HX SHOULDER REPLACEMENT Left 2014     Family History   Problem Relation Age of Onset    Heart Disease Father     Hypertension Father     Heart Attack Father     Alcohol abuse Mother     Cancer Mother         MOUTH,THROAT CA      Social History     Tobacco Use    Smoking status: Current Every Day Smoker     Packs/day: 1.00     Years: 50.00     Pack years: 50.00    Smokeless tobacco: Never Used   Substance Use Topics    Alcohol use: No       Prior to Admission medications    Medication Sig Start Date End Date Taking? Authorizing Provider   conjugated estrogens (Premarin) 0.625 mg tablet Take 1 Tab by mouth daily. 20   Travis Horner MD   lisinopriL (PRINIVIL, ZESTRIL) 20 mg tablet Take 1 Tab by mouth daily. 20   Travis Horner MD   OTHER This is to certify that Nyasia Mack  : 1945 has the following conditions: an inability to ordinarily walk one hundred feet nonstop without aggravating an existing medical condition, including increase of pain 7/15/20   Travis Horner MD   fluticasone-umeclidinium-vilanterol (TRELEGY ELLIPTA) 100-62.5-25 mcg inhaler Take 1 Puff by inhalation daily. 20   Travis Horner MD   budesonide-formoteroL [facility]) 160-4.5 mcg/actuation HFAA Take 2 Puffs by inhalation two (2) times a day. 20   Travis Horner MD   meloxicam (MOBIC) 15 mg tablet Take 1 Tab by mouth daily. 3/25/20   PARKER Kingsley   albuterol (VENTOLIN HFA) 90 mcg/actuation inhaler Take 1 Puff by inhalation every four (4) hours as needed for Wheezing.  And Wheezing 10/28/19   Travis Horner MD   DISABLED PLACARD (DISABLED PLACARD) DMV An inability to ordinarily walk one hundred feet without aggravating an existing medical condition, including the increase of pain 19   Lexie Padilla MD   OTHER This is to certify that Gissel Artis  : 1945 has the following conditions: an inability to ordinarily walk one hundred feet nonstop without aggravating an existing medical condition, including increase of pain 5/10/18   Lexie Padilla MD     No Known Allergies     Review of Systems:  CONSTITUTIONAL: No fever, chills  HEAD: No headache  EYES: No visual loss  ENT: No hearing loss  SKIN: No rash  CARDIOVASCULAR: No chest pain  RESPIRATORY: No shortness of breath  GASTROINTESTINAL: No nausea, vomiting  GENITOURINARY: No excessive urination  NEUROLOGICAL: No weakness  MUSCULOSKELETAL: No muscle pain. No neck pain  HEMATOLOGIC: No easy bleeding  LYMPHATICS: No lymphedema. PSYCHIATRIC: No current depression  ENDOCRINOLOGIC: No high sugars  ALLERGIES: No history of asthma, hives, eczema or rhinitis. No results found for: HBA1C, TRE1RHIJ, HGBE8, HVN5SDOI, NMP7XEJT, YLD7KWTU     Immunization History   Administered Date(s) Administered    Influenza High Dose Vaccine PF 10/24/2018, 2020    Influenza Vaccine 2013, 2019    Influenza Vaccine (Tri) Adjuvanted (>65 Yrs FLUAD TRI 73483) 10/24/2018, 2019    Pneumococcal Polysaccharide (PPSV-23) 2018    TB Skin Test (PPD) Intradermal 2016    Tdap 2015       Body mass index is 21 kg/m². Current medications:  Current Outpatient Medications   Medication Sig Dispense Refill    conjugated estrogens (Premarin) 0.625 mg tablet Take 1 Tab by mouth daily. 30 Tab 6    lisinopriL (PRINIVIL, ZESTRIL) 20 mg tablet Take 1 Tab by mouth daily.  90 Tab 3    OTHER This is to certify that Gissel Wisdom  : 1945 has the following conditions: an inability to ordinarily walk one hundred feet nonstop without aggravating an existing medical condition, including increase of pain 1 Each 0    fluticasone-umeclidinium-vilanterol (TRELEGY ELLIPTA) 100-62.5-25 mcg inhaler Take 1 Puff by inhalation daily. 1 Inhaler 5    budesonide-formoteroL (SYMBICORT) 160-4.5 mcg/actuation HFAA Take 2 Puffs by inhalation two (2) times a day. 1 Inhaler 6    meloxicam (MOBIC) 15 mg tablet Take 1 Tab by mouth daily. 30 Tab 3    albuterol (VENTOLIN HFA) 90 mcg/actuation inhaler Take 1 Puff by inhalation every four (4) hours as needed for Wheezing. And Wheezing 1 Inhaler 6    DISABLED PLACARD (DISABLED PLACARD) DMV An inability to ordinarily walk one hundred feet without aggravating an existing medical condition, including the increase of pain 1 Each 0    OTHER This is to certify that Jayda Cassette  : 1945 has the following conditions: an inability to ordinarily walk one hundred feet nonstop without aggravating an existing medical condition, including increase of pain 1 Each 0         Objective:     Physical Exam:     Visit Vitals  Temp 97.5 °F (36.4 °C)   Ht 5' 9\" (1.753 m)   Wt 64.5 kg (142 lb 3.2 oz)   BMI 21.00 kg/m²       General: well developed thin but overall healthy appearing  Psych: cooperative. Pleasant  Neuro: alert and oriented to person/place/situation. Otherwise nonfocal.  Derm: Normal turgor for age, dry skin  HEENT: Normocephalic, atraumatic. EOMI. Neck: Normal range of motion.   Chest: Respirations nonlabored  Cardio: RRR  Abdomen: Soft, nondistended  Lower extremities:   + hemosiderrosis  + large and small vessel varicosities  Capillary refill <3 sec    Right  2+ DP/PT    Left  2+ DP/PT                              Ulcer Description:   Wound Knee Anterior;Right #1 right knee 20 (Active)   Dressing Status Old drainage 20 1314   Non-staged Wound Description Full thickness 20 1314   Wound Length (cm) 2.5 cm 20 1314   Wound Width (cm) 1.6 cm 20 1314   Wound Depth (cm) 0.1 cm 20 1314   Wound Surface Area (cm^2) 4 cm^2 20 1314   Wound Volume (cm^3) 0.4 cm^3 20 1314   Change in Wound Size % 36.51 20 1314   Condition of Base Granulation;Slough 09/25/20 1314   Assessment Pink 09/25/20 1314   Tissue Type Percent Red 90 09/25/20 1314   Tissue Type Percent Yellow 10 09/25/20 1314   Drainage Amount Small 09/25/20 1314   Drainage Color Serosanguinous 09/25/20 1314   Wound Odor None 09/25/20 1314   Ana-wound Assessment Blanchable erythema;Painful 09/25/20 1314   Cleansing and Cleansing Agents  Normal saline 09/25/20 1314   Dressing Changed Changed/New 09/25/20 1314   Number of days: 21       Wound Arm lower Anterior;Right #2 right forearm 09/11/20 (Active)   Dressing Status Old drainage 09/25/20 1314   Non-staged Wound Description Partial thickness 09/25/20 1314   Wound Length (cm) 7 cm 09/25/20 1314   Wound Width (cm) 2.5 cm 09/25/20 1314   Wound Depth (cm) 0.1 cm 09/25/20 1314   Wound Surface Area (cm^2) 17.5 cm^2 09/25/20 1314   Wound Volume (cm^3) 1.75 cm^3 09/25/20 1314   Change in Wound Size % 22.22 09/25/20 1314   Condition of Base Granulation;Epithelializing 09/25/20 1314   Epithelialization (%) 25 09/25/20 1314   Assessment Pink;Painful 09/25/20 1314   Tissue Type Percent Pink 50 09/25/20 1314   Tissue Type Percent Red 50 09/25/20 1314   Drainage Amount Small 09/25/20 1314   Drainage Color Serosanguinous 09/25/20 1314   Wound Odor None 09/25/20 1314   Ana-wound Assessment Intact 09/25/20 1314   Cleansing and Cleansing Agents  Normal saline 09/25/20 1314   Dressing Changed Changed/New 09/25/20 1314   Number of days: 21       [REMOVED] Wound Shoulder Left (Removed)   Number of days: 2176       [REMOVED] Wound Knee Left (Removed)   Number of days: 2832       [REMOVED] Wound Leg Lower Right;Lateral (Removed)   Dressing Status  Clean, dry, and intact 07/31/20 1339   Dressing Type  Xeroform;ABD pad;Compression Wrap/Venous Stasis 07/17/20 1407   Non-Pressure Injury Full thickness (subcut/muscle) 07/31/20 1339   Wound Length (cm) 0 cm 07/31/20 1339   Wound Width (cm) 0 cm 07/31/20 1339   Wound Depth (cm) 0 07/31/20 1339   Wound Surface area (cm^2) 0 cm^2 07/31/20 1339   Change in Wound Size % 100 07/31/20 1339   Condition of Base Epithelializing 07/31/20 1339   Condition of Edges Open 07/17/20 1407   Epithelialization (%) 100 07/31/20 1339   Tissue Type Yellow; Red 07/17/20 1407   Tissue Type Percent Pink 100 07/31/20 1339   Tissue Type Percent Red 95 07/17/20 1407   Tissue Type Percent Yellow 5 07/17/20 1407   Drainage Amount  None 07/31/20 1339   Drainage Color Serosanguinous 07/17/20 1407   Wound Odor None 07/31/20 1339   Periwound Skin Condition Intact 07/31/20 1339   Cleansing and Cleansing Agents  Normal saline 07/31/20 1339   Dressing Type Applied Foam 07/31/20 1339   Number of days: 21       [REMOVED] Wound Leg lower Left; Anterior (Removed)   Dressing Status Clean, dry, and intact 08/28/20 1318   Dressing Type Open to air 07/17/20 1407   Non-staged Wound Description Full thickness 08/14/20 1319   Wound Length (cm) 0 cm 08/28/20 1318   Wound Width (cm) 0 cm 08/28/20 1318   Wound Depth (cm) 0 cm 08/28/20 1318   Wound Surface Area (cm^2) 0 cm^2 08/28/20 1318   Wound Volume (cm^3) 0 cm^3 08/28/20 1318   Change in Wound Size % 100 08/28/20 1318   Condition of Base Epithelializing 08/28/20 1318   Condition of Edges Open 08/14/20 1319   Epithelialization (%) 100 08/28/20 1318   Assessment Red;Pink 08/14/20 1319   Tissue Type Percent Maroon/Purple 100 % 07/17/20 1407   Tissue Type Percent Pink 10 08/14/20 1319   Tissue Type Percent Red 90 08/14/20 1319   Drainage Amount None 08/28/20 1318   Drainage Color Serosanguinous 08/14/20 1319   Wound Odor None 08/28/20 1318   Ana-wound Assessment Edema 08/14/20 1319   Margins Unattached edges 07/17/20 1407   Cleansing and Cleansing Agents  Normal saline 08/28/20 1318   Dressing Changed Changed/New 08/14/20 1319   Dressing Type Applied Packing;ABD pad;Gauze wrap (keyur) 07/17/20 5179   Number of days: 42           Problem List  Date Reviewed: 10/28/2019          Codes Class Noted    Status post total left knee replacement ICD-10-CM: V15.426  ICD-9-CM: V43.65  2/24/2016        Osteoarthritis ICD-10-CM: M19.90  ICD-9-CM: 715.90  Unknown        Age-related osteoporosis without current pathological fracture ICD-10-CM: M81.0  ICD-9-CM: 733.01  5/11/2015        Primary osteoarthritis involving multiple joints ICD-10-CM: M89.49  ICD-9-CM: 715.98  5/11/2015        Vitamin D deficiency ICD-10-CM: E55.9  ICD-9-CM: 268.9  5/11/2015        Tobacco use disorder, continuous ICD-10-CM: F17.209  ICD-9-CM: 305.1  5/11/2015        Hyperlipemia ICD-10-CM: E78.5  ICD-9-CM: 272.4  5/11/2015        Occlusion and stenosis of carotid artery without mention of cerebral infarction ICD-10-CM: I65.29  ICD-9-CM: 433.10  5/11/2015        Diverticulosis of colon (without mention of hemorrhage) ICD-10-CM: K57.30  ICD-9-CM: 562.10  5/11/2015        Essential hypertension ICD-10-CM: I10  ICD-9-CM: 401.9  8/18/2014        COPD (chronic obstructive pulmonary disease) (HCC) (Chronic) ICD-10-CM: J44.9  ICD-9-CM: 496  8/18/2014        Glaucoma (Chronic) ICD-10-CM: H40.9  ICD-9-CM: 365.9  8/18/2014                    Assessment/Plan:     Continue dressing changes with xeroform. Avoid any trauma. Knee pain more consistent with contusion than infection or the wound specifically. Offered orthopedic follow up and patient prefers observation. Recheck in 2 weeks.        Signed By: Alysa Petersen MD

## 2020-10-09 ENCOUNTER — HOSPITAL ENCOUNTER (OUTPATIENT)
Dept: WOUND CARE | Age: 75
Discharge: HOME | End: 2020-10-09
Attending: SURGERY
Payer: MEDICARE

## 2020-10-09 VITALS
WEIGHT: 140 LBS | TEMPERATURE: 97.7 F | HEART RATE: 80 BPM | DIASTOLIC BLOOD PRESSURE: 93 MMHG | SYSTOLIC BLOOD PRESSURE: 164 MMHG | HEIGHT: 69 IN | BODY MASS INDEX: 20.73 KG/M2

## 2020-10-09 PROCEDURE — 99213 OFFICE O/P EST LOW 20 MIN: CPT

## 2020-10-09 NOTE — WOUND CARE
10/09/20 1312 Wound Arm lower Anterior;Right #2 right forearm 09/11/20 Date First Assessed/Time First Assessed: 09/11/20 1540   Present on Hospital Admission: Yes  Wound Approximate Age at First Assessment (Weeks): 1 weeks  Primary Wound Type: Skin Tear  Location: Arm lower  Wound Location Orientation: Anterior;Right  Wo. .. Dressing Status Clean, dry, and intact Dressing Type  
(xeroform, foam) Wound Length (cm) 0 cm Wound Width (cm) 0 cm Wound Depth (cm) 0 cm Wound Surface Area (cm^2) 0 cm^2 Wound Volume (cm^3) 0 cm^3 Change in Wound Size % 100 Cleansing and Cleansing Agents  Normal saline

## 2020-10-09 NOTE — WOUND CARE
[address] [place] [address], [address] [address] Phone: [phone] Fax: [phone] Patient: Joel Weeks MRN: [id number]  SSN: [id number] YOB: 1945  Age: 66 y.o. Sex: female Return Appointment: 2 weeks with Silvia Rodriguez MD 
 
Instructions:  
 
Right knee Cleanse wound and periwound with wound cleanser or normal saline. Xeroform- apply to wound bed. Secure with border foam. 
Change every 2 to 3 days as needed.  
  
Follow up in wound center in 2 weeks.  
 
Should you experience increased redness, swelling, pain, foul odor, size of wound(s), or have a temperature over 101 degrees please contact the [address] [place] [address] Road at [phone] or if after hours contact your primary care physician or go to the hospital emergency department. Signed By: Lyn Frankel, RN October 9, 2020

## 2020-10-16 NOTE — PROGRESS NOTES
Wound Center Progress Note    Patient: Forrest Ramirez MRN: [id number]  SSN: [id number]    YOB: 1945  Age: 66 y.o. Sex: female      Subjective:     Chief Complaint:  R forearm and knee wounds following fall    History of Present Illness:       Wound Caused By: fall, venous disease  Associated Signs and Symptoms: some drainage, pain  Timing: since June 2020  Quality: wound  Severity: full thickness    Previously treated at the wound center. For right leg ulcer. Recent fall. Struck knee and forearm. Did not seek care at the ER. Continued pain in right knee but generalized and not located specifically at the joint. No new issues at the forearm. 10/9/2020 Doing well with dressing changes. No new falls or injuries.      Past Medical History:   Diagnosis Date    Chronic obstructive pulmonary disease ([address]     uses inhalers; pt states has no pulmonologist    Chronic pain     lower back    Colitis 2001 & 2014    pt states no problems now 2/1/16    Diverticulosis of colon (without mention of hemorrhage) 5/11/2015    Fibrocystic breast     bilat breasts    Glaucoma     uses eye drops    H/O echocardiogram 3/27/14    EF 55%-60%; mild pulmonary artery HTN    History of ischemic colitis 2001    Hyperlipemia 5/11/2015    pt states no problems now 2/1/16    Hypertension     managed w/med    Occlusion and stenosis of carotid artery without mention of cerebral infarction 5/11/2015    Osteoarthritis     Osteoarthrosis, unspecified whether generalized or localized, hand 5/11/2015    Osteoporosis, unspecified 5/11/2015    Post-operative nausea and vomiting     pt states hx of; pt states no current problems after left shoulder replacement    Proteinuria 5/11/2015    Sciatica of right side     takes lyrica    Shingles 1980    Status post total left knee replacement 2/24/2016    Syncope 1/2014    X1 episode    Tobacco use disorder 5/11/2015    1/2 ppd for 50 yrs    Vitamin D deficiency 2015    takes vitamin d supplement      Past Surgical History:   Procedure Laterality Date    HX APPENDECTOMY      HX BREAST BIOPSY Right     X2 benign biopsies    HX COLONOSCOPY  2014    HX HYSTERECTOMY  1976    due to endometriosis    HX KNEE REPLACEMENT Left 2016    HX SHOULDER REPLACEMENT Left 2014     Family History   Problem Relation Age of Onset    Heart Disease Father     Hypertension Father     Heart Attack Father     Alcohol abuse Mother     Cancer Mother         MOUTH,THROAT CA      Social History     Tobacco Use    Smoking status: Current Every Day Smoker     Packs/day: 1.00     Years: 50.00     Pack years: 50.00    Smokeless tobacco: Never Used   Substance Use Topics    Alcohol use: No       Prior to Admission medications    Medication Sig Start Date End Date Taking? Authorizing Provider   conjugated estrogens (Premarin) 0.625 mg tablet Take 1 Tab by mouth daily. 20   Mariana Gutierrez MD   lisinopriL (PRINIVIL, ZESTRIL) 20 mg tablet Take 1 Tab by mouth daily. 20   Mariana Gutierrez MD   OTHER This is to certify that Fernando Blunt  : 1945 has the following conditions: an inability to ordinarily walk one hundred feet nonstop without aggravating an existing medical condition, including increase of pain 7/15/20   Mariana Gutierrez MD   fluticasone-umeclidinium-vilanterol (TRELEGY ELLIPTA) 100-62.5-25 mcg inhaler Take 1 Puff by inhalation daily. 20   Mariana Gutierrez MD   budesonide-formoteroL [facility]) 160-4.5 mcg/actuation HFAA Take 2 Puffs by inhalation two (2) times a day. 20   Mariana Gutierrez MD   meloxicam (MOBIC) 15 mg tablet Take 1 Tab by mouth daily. 3/25/20   Jaylyn Rocheira FNCHELI   albuterol (VENTOLIN HFA) 90 mcg/actuation inhaler Take 1 Puff by inhalation every four (4) hours as needed for Wheezing.  And Wheezing 10/28/19   Mariana Gutierrez MD   DISABLED PLACARD (DISABLED PLACARD) DMV An inability to ordinarily walk one hundred feet without aggravating an existing medical condition, including the increase of pain 19   John Roa MD   OTHER This is to certify that Anny Ward  : 1945 has the following conditions: an inability to ordinarily walk one hundred feet nonstop without aggravating an existing medical condition, including increase of pain 5/10/18   John Roa MD     No Known Allergies     Review of Systems:  CONSTITUTIONAL: No fever, chills  HEAD: No headache  EYES: No visual loss  ENT: No hearing loss  SKIN: No rash  CARDIOVASCULAR: No chest pain  RESPIRATORY: No shortness of breath  GASTROINTESTINAL: No nausea, vomiting  GENITOURINARY: No excessive urination  NEUROLOGICAL: No weakness  MUSCULOSKELETAL: No muscle pain. No neck pain  HEMATOLOGIC: + easy bleeding, easy bruising  LYMPHATICS: No lymphedema. PSYCHIATRIC: No current depression  ENDOCRINOLOGIC: No high sugars  ALLERGIES: No history of asthma, hives, eczema or rhinitis. No results found for: HBA1C, OSV0BQBG, HGBE8, LWJ7YFLL, TMM9FWHP, TAR2PSGF     Immunization History   Administered Date(s) Administered    Influenza High Dose Vaccine PF 10/24/2018, 2020    Influenza Vaccine 2013, 2019    Influenza Vaccine (Tri) Adjuvanted (>65 Yrs FLUAD TRI 42176) 10/24/2018, 2019    Pneumococcal Polysaccharide (PPSV-23) 2018    TB Skin Test (PPD) Intradermal 2016    Tdap 2015       Body mass index is 20.67 kg/m². Current medications:  Current Outpatient Medications   Medication Sig Dispense Refill    conjugated estrogens (Premarin) 0.625 mg tablet Take 1 Tab by mouth daily. 30 Tab 6    lisinopriL (PRINIVIL, ZESTRIL) 20 mg tablet Take 1 Tab by mouth daily.  90 Tab 3    OTHER This is to certify that Anny Ward  : 1945 has the following conditions: an inability to ordinarily walk one hundred feet nonstop without aggravating an existing medical condition, including increase of pain 1 Each 0    fluticasone-umeclidinium-vilanterol (TRELEGY ELLIPTA) 100-62.5-25 mcg inhaler Take 1 Puff by inhalation daily. 1 Inhaler 5    budesonide-formoteroL (SYMBICORT) 160-4.5 mcg/actuation HFAA Take 2 Puffs by inhalation two (2) times a day. 1 Inhaler 6    meloxicam (MOBIC) 15 mg tablet Take 1 Tab by mouth daily. 30 Tab 3    albuterol (VENTOLIN HFA) 90 mcg/actuation inhaler Take 1 Puff by inhalation every four (4) hours as needed for Wheezing. And Wheezing 1 Inhaler 6    DISABLED PLACARD (DISABLED PLACARD) DMV An inability to ordinarily walk one hundred feet without aggravating an existing medical condition, including the increase of pain 1 Each 0    OTHER This is to certify that Wagner Linares  : 1945 has the following conditions: an inability to ordinarily walk one hundred feet nonstop without aggravating an existing medical condition, including increase of pain 1 Each 0         Objective:     Physical Exam:     Visit Vitals  BP (!) 164/93 (BP 1 Location: Left arm)   Pulse 80   Temp 97.7 °F (36.5 °C)   Ht 5' 9\" (1.753 m)   Wt 63.5 kg (140 lb)   BMI 20.67 kg/m²       General: well developed thin but overall healthy appearing  Psych: cooperative. Pleasant  Neuro: alert and oriented to person/place/situation. Otherwise nonfocal.  Derm: Normal turgor for age, dry skin  HEENT: Normocephalic, atraumatic. EOMI. Neck: Normal range of motion.   Chest: Respirations nonlabored  Cardio: RRR  Abdomen: Soft, nondistended  Lower extremities:   + hemosiderrosis  + large and small vessel varicosities  Capillary refill <3 sec    Right  2+ DP/PT    Left  2+ DP/PT              Ulcer Description:   Wound Knee Anterior;Right #1 right knee 20 (Active)   Dressing Status Clean, dry, and intact 10/09/20 1312   Non-staged Wound Description Full thickness 10/09/20 1312   Wound Length (cm) 2 cm 10/09/20 1312   Wound Width (cm) 0.8 cm 10/09/20 1312   Wound Depth (cm) 0.1 cm 10/09/20 1312   Wound Surface Area (cm^2) 1.6 cm^2 10/09/20 1312   Wound Volume (cm^3) 0.16 cm^3 10/09/20 1312   Change in Wound Size % 74.6 10/09/20 1312   Condition of [facility]; Epithelializing 10/09/20 1312   Assessment Pink 09/25/20 1314   Tissue Type Percent Pink 80 10/09/20 1312   Tissue Type Percent Red 90 09/25/20 1314   Tissue Type Percent Yellow 20 10/09/20 1312   Drainage Amount Scant 10/09/20 1312   Drainage Color Serous 10/09/20 1312   Wound Odor None 10/09/20 1312   Ana-wound Assessment Blanchable erythema;Painful 09/25/20 1314   Cleansing and Cleansing Agents  Normal saline 10/09/20 1312   Dressing Changed Changed/New 09/25/20 1314   Number of days: 35       Wound Arm lower Anterior;Right #2 right forearm 09/11/20 (Active)   Dressing Status Clean, dry, and intact 10/09/20 1312   Non-staged Wound Description Partial thickness 09/25/20 1314   Wound Length (cm) 0 cm 10/09/20 1312   Wound Width (cm) 0 cm 10/09/20 1312   Wound Depth (cm) 0 cm 10/09/20 1312   Wound Surface Area (cm^2) 0 cm^2 10/09/20 1312   Wound Volume (cm^3) 0 cm^3 10/09/20 1312   Change in Wound Size % 100 10/09/20 1312   Condition of Base Granulation;Epithelializing 09/25/20 1314   Epithelialization (%) 25 09/25/20 1314   Assessment Pink;Painful 09/25/20 1314   Tissue Type Percent Pink 50 09/25/20 1314   Tissue Type Percent Red 50 09/25/20 1314   Drainage Amount Small 09/25/20 1314   Drainage Color Serosanguinous 09/25/20 1314   Wound Odor None 09/25/20 1314   Ana-wound Assessment Intact 09/25/20 1314   Cleansing and Cleansing Agents  Normal saline 10/09/20 1312   Dressing Changed Changed/New 09/25/20 1314   Number of days: 35       [REMOVED] Wound Shoulder Left (Removed)   Number of days: 2176       [REMOVED] Wound Knee Left (Removed)   Number of days: 2760       [REMOVED] Wound Leg Lower Right;Lateral (Removed)   Dressing Status  Clean, dry, and intact 07/31/20 1339   Dressing Type  Xeroform;ABD pad;Compression Wrap/Venous Stasis 07/17/20 1407   Non-Pressure Injury Full thickness (subcut/muscle) 07/31/20 1339   Wound Length (cm) 0 cm 07/31/20 1339   Wound Width (cm) 0 cm 07/31/20 1339   Wound Depth (cm) 0 07/31/20 1339   Wound Surface area (cm^2) 0 cm^2 07/31/20 1339   Change in Wound Size % 100 07/31/20 1339   Condition of Base Epithelializing 07/31/20 1339   Condition of Edges Open 07/17/20 1407   Epithelialization (%) 100 07/31/20 1339   Tissue Type Yellow; Red 07/17/20 1407   Tissue Type Percent Pink 100 07/31/20 1339   Tissue Type Percent Red 95 07/17/20 1407   Tissue Type Percent Yellow 5 07/17/20 1407   Drainage Amount  None 07/31/20 1339   Drainage Color Serosanguinous 07/17/20 1407   Wound Odor None 07/31/20 1339   Periwound Skin Condition Intact 07/31/20 1339   Cleansing and Cleansing Agents  Normal saline 07/31/20 1339   Dressing Type Applied Foam 07/31/20 1339   Number of days: 21       [REMOVED] Wound Leg lower Left; Anterior (Removed)   Dressing Status Clean, dry, and intact 08/28/20 1318   Dressing Type Open to air 07/17/20 1407   Non-staged Wound Description Full thickness 08/14/20 1319   Wound Length (cm) 0 cm 08/28/20 1318   Wound Width (cm) 0 cm 08/28/20 1318   Wound Depth (cm) 0 cm 08/28/20 1318   Wound Surface Area (cm^2) 0 cm^2 08/28/20 1318   Wound Volume (cm^3) 0 cm^3 08/28/20 1318   Change in Wound Size % 100 08/28/20 1318   Condition of Base Epithelializing 08/28/20 1318   Condition of Edges Open 08/14/20 1319   Epithelialization (%) 100 08/28/20 1318   Assessment Red;Pink 08/14/20 1319   Tissue Type Percent Maroon/Purple 100 % 07/17/20 1407   Tissue Type Percent Pink 10 08/14/20 1319   Tissue Type Percent Red 90 08/14/20 1319   Drainage Amount None 08/28/20 1318   Drainage Color Serosanguinous 08/14/20 1319   Wound Odor None 08/28/20 1318   Ana-wound Assessment Edema 08/14/20 1319   Margins Unattached edges 07/17/20 1407   Cleansing and Cleansing Agents  Normal saline 08/28/20 1318   Dressing Changed Changed/New 08/14/20 1319   Dressing Type Applied Packing;ABD pad;Gauze wrap (keyur) 07/17/20 1407   Number of days: 42           Problem List  Date Reviewed: 10/28/2019          Codes Class Noted    Status post total left knee replacement ICD-10-CM: Y05.782  ICD-9-CM: V43.65  2/24/2016        Osteoarthritis ICD-10-CM: M19.90  ICD-9-CM: 715.90  Unknown        Age-related osteoporosis without current pathological fracture ICD-10-CM: M81.0  ICD-9-CM: 733.01  5/11/2015        Primary osteoarthritis involving multiple joints ICD-10-CM: M89.49  ICD-9-CM: 715.98  5/11/2015        Vitamin D deficiency ICD-10-CM: E55.9  ICD-9-CM: 268.9  5/11/2015        Tobacco use disorder, continuous ICD-10-CM: F17.209  ICD-9-CM: 305.1  5/11/2015        Hyperlipemia ICD-10-CM: E78.5  ICD-9-CM: 272.4  5/11/2015        Occlusion and stenosis of carotid artery without mention of cerebral infarction ICD-10-CM: I65.29  ICD-9-CM: 433.10  5/11/2015        Diverticulosis of colon (without mention of hemorrhage) ICD-10-CM: K57.30  ICD-9-CM: 562.10  5/11/2015        Essential hypertension ICD-10-CM: I10  ICD-9-CM: 401.9  8/18/2014        COPD (chronic obstructive pulmonary disease) (HCC) (Chronic) ICD-10-CM: J44.9  ICD-9-CM: 496  8/18/2014        Glaucoma (Chronic) ICD-10-CM: H40.9  ICD-9-CM: 365.9  8/18/2014                    Assessment/Plan:     Continue dressing changes with xeroform. Avoid any trauma. Knee pain more consistent with contusion than infection or the wound specifically. Offered orthopedic follow up and patient prefers observation. Recheck in 2 weeks.        Signed By: Roseline Aguilar MD

## 2020-10-22 ENCOUNTER — HOSPITAL ENCOUNTER (OUTPATIENT)
Dept: MAMMOGRAPHY | Age: 75
Discharge: HOME | End: 2020-10-22
Attending: FAMILY MEDICINE
Payer: MEDICARE

## 2020-10-22 DIAGNOSIS — Z12.31 ENCOUNTER FOR SCREENING MAMMOGRAM FOR MALIGNANT NEOPLASM OF BREAST: ICD-10-CM

## 2020-10-22 DIAGNOSIS — Z12.39 SCREENING FOR BREAST CANCER: ICD-10-CM

## 2020-10-22 DIAGNOSIS — M81.0 AGE-RELATED OSTEOPOROSIS WITHOUT CURRENT PATHOLOGICAL FRACTURE: ICD-10-CM

## 2020-10-22 PROCEDURE — 77067 SCR MAMMO BI INCL CAD: CPT

## 2020-10-22 PROCEDURE — 77080 DXA BONE DENSITY AXIAL: CPT

## 2021-03-12 ENCOUNTER — HOSPITAL ENCOUNTER (OUTPATIENT)
Dept: WOUND CARE | Age: 76
Discharge: HOME | End: 2021-03-12
Attending: SURGERY

## 2021-03-12 VITALS
WEIGHT: 142 LBS | RESPIRATION RATE: 18 BRPM | TEMPERATURE: 97.6 F | HEART RATE: 86 BPM | SYSTOLIC BLOOD PRESSURE: 166 MMHG | HEIGHT: 68 IN | DIASTOLIC BLOOD PRESSURE: 100 MMHG | BODY MASS INDEX: 21.52 KG/M2

## 2021-03-12 NOTE — WOUND CARE
[address]  [address], [address]   Phone: [phone]  Fax: [phone]    Patient: Renee Lazcano MRN: [id number]  SSN: [id number]    YOB: 1945  Age: 64 y.o. Sex: female       Return Appointment: 2 weeks with Gilbert Aguilar MD    Instructions: Left lower leg:  Clean wound with saline. Hydrofera Ready: Cut to wound size, place in wound bed, shiny side out. Cover with bandaid or foam.  Change dressings on Monday, Wednesday and Friday. Wear tubigrip to left lower leg during the day. May remove at night. Elevate left lower leg when sitting, do not cross legs. Should you experience increased redness, swelling, pain, foul odor, size of wound(s), or have a temperature over 101 degrees please contact the [address] [place] [address] Road at [phone] or if after hours contact your primary care physician or go to the hospital emergency department.     Signed By: Brenda Green RN     March 12, 2021

## 2021-03-12 NOTE — WOUND CARE
03/12/21 0942   Wound Pretibial Left #1   Date First Assessed/Time First Assessed: 03/12/21 0938   Present on Hospital Admission: Yes  Primary Wound Type: Abrasion  Location: Pretibial  Wound Location Orientation: Left  Wound Description: #1   Wound Image    Wound Etiology Traumatic   Dressing Status Old drainage noted   Cleansed Cleansed with saline   Dressing/Treatment   (xeroform, foam)   Wound Length (cm) 3.7 cm   Wound Width (cm) 2.1 cm   Wound Depth (cm) 0.1 cm   Wound Surface Area (cm^2) 7.77 cm^2   Wound Volume (cm^3) 0.78 cm^3   Wound Assessment Slough;Pink/red   Drainage Amount Moderate   Drainage Description Serosanguinous   Wound Odor Mild   Ana-Wound/Incision Assessment Blanchable erythema;Edematous   Wound Thickness Description Full thickness   wound was mechanically debrided with saline and gauze.

## 2021-03-26 ENCOUNTER — HOSPITAL ENCOUNTER (OUTPATIENT)
Dept: WOUND CARE | Age: 76
Discharge: HOME | End: 2021-03-26
Attending: SURGERY
Payer: MEDICARE

## 2021-03-26 VITALS
OXYGEN SATURATION: 97 % | BODY MASS INDEX: 21.1 KG/M2 | HEART RATE: 76 BPM | HEIGHT: 68 IN | RESPIRATION RATE: 18 BRPM | TEMPERATURE: 96.6 F | WEIGHT: 139.2 LBS | DIASTOLIC BLOOD PRESSURE: 94 MMHG | SYSTOLIC BLOOD PRESSURE: 186 MMHG

## 2021-03-26 PROCEDURE — 99213 OFFICE O/P EST LOW 20 MIN: CPT

## 2021-03-26 NOTE — DISCHARGE INSTRUCTIONS
[address]  [address], [address]  Phone: [phone]  Fax: [phone]    Patient: Edil Lombardi MRN: [id number]  SSN: [id number]    YOB: 1945  Age: 64 y.o. Sex: female       Return Appointment: 3 weeks with Otto Dejesus MD    Instructions:  Left lower leg:  Clean wound with saline. Hydrofera Ready: Cut to wound size, place in wound bed, shiny side out. Cover with bandaid or foam.  Change dressings on Monday, Wednesday and Friday. Wear tubigrip to left lower leg during the day. May remove at night.     Elevate left lower leg when sitting, do not cross legs. Should you experience increased redness, swelling, pain, foul odor, size of wound(s), or have a temperature over 101 degrees please contact the [address] [place] [address] Road at [phone] or if after hours contact your primary care physician or go to the hospital emergency department.     Signed By: Valentino Huh, RN     March 26, 2021

## 2021-03-26 NOTE — WOUND CARE
[address] [place] [address], [facility] [address] Phone: [phone] Fax: [phone] Patient: Dheeraj Rowland MRN: [id number]  SSN: [id number] YOB: 1945  Age: 64 y.o. Sex: female Return Appointment: 3 weeks with Bibi Gamez MD 
 
Instructions:  Left lower leg: 
Clean wound with saline. Hydrofera Ready: Cut to wound size, place in wound bed, shiny side out. Cover with bandaid or foam. 
Change dressings on Monday, Wednesday and Friday. Wear tubigrip to left lower leg during the day. May remove at night. 
  
Elevate left lower leg when sitting, do not cross legs. Should you experience increased redness, swelling, pain, foul odor, size of wound(s), or have a temperature over 101 degrees please contact the [address] [place] [address] Road at [phone] or if after hours contact your primary care physician or go to the hospital emergency department. Signed By: Monisha Spring RN   
 March 26, 2021

## 2021-03-26 NOTE — WOUND CARE
03/26/21 1005 Wound Pretibial Left #1 Date First Assessed/Time First Assessed: 03/12/21 0938   Present on Hospital Admission: Yes  Primary Wound Type: Abrasion  Location: Pretibial  Wound Location Orientation: Left  Wound Description: #1 Wound Image Wound Etiology Traumatic Dressing Status Breakthrough drainage noted Cleansed Cleansed with saline Dressing/Treatment  
(Hydrofera ready, Bordered Foam) Wound Length (cm) 3.4 cm Wound Width (cm) 2.1 cm Wound Depth (cm) 0.1 cm Wound Surface Area (cm^2) 7.14 cm^2 Change in Wound Size % 8.11 Wound Volume (cm^3) 0.71 cm^3 Wound Healing % 9 Wound Assessment [place]/red;Slough Drainage Amount Moderate Drainage Description Serosanguinous Wound Odor None Ana-Wound/Incision Assessment Hemosiderin staining (brown yellow) Wound Thickness Description Full thickness

## 2021-04-16 ENCOUNTER — HOSPITAL ENCOUNTER (OUTPATIENT)
Dept: WOUND CARE | Age: 76
Discharge: HOME | End: 2021-04-16
Attending: SURGERY
Payer: MEDICARE

## 2021-04-16 VITALS
HEART RATE: 75 BPM | DIASTOLIC BLOOD PRESSURE: 87 MMHG | SYSTOLIC BLOOD PRESSURE: 143 MMHG | HEIGHT: 68 IN | WEIGHT: 140.4 LBS | RESPIRATION RATE: 18 BRPM | BODY MASS INDEX: 21.28 KG/M2 | TEMPERATURE: 97.1 F

## 2021-04-16 PROCEDURE — 99212 OFFICE O/P EST SF 10 MIN: CPT

## 2021-04-16 NOTE — WOUND CARE
[address] [place] [address], [address] [address] Phone: [phone] Fax: [phone] Patient: Shey Villarreal MRN: [id number]  SSN: [id number] YOB: 1945  Age: 64 y.o. Sex: female Return Appointment: 2 weeks with Travis Garcia MD 
 
Instructions: Left lower leg: 
Clean wound with saline. Hydrofera Ready: Cut to wound size, place in wound bed, shiny side out. Cover with bandaid or foam. 
Change dressings on Monday, Wednesday and Friday. Wear tubigrip to left lower leg during the day. May remove at night. 
  
Elevate left lower leg when sitting, do not cross legs. Should you experience increased redness, swelling, pain, foul odor, size of wound(s), or have a temperature over 101 degrees please contact the [address] [place] [address] Road at [phone] or if after hours contact your primary care physician or go to the hospital emergency department. Signed By: Kory Olmedo April 16, 2021

## 2021-04-16 NOTE — DISCHARGE INSTRUCTIONS
[address]  [address], [address]  Phone: [phone]  Fax: [phone]    Patient: Natalee Tellez MRN: [id number]  SSN: [id number]    YOB: 1945  Age: 64 y.o. Sex: female       Return Appointment: 2 weeks with Cassi Krueger MD    Instructions: Left lower leg:  Clean wound with saline. Hydrofera Ready: Cut to wound size, place in wound bed, shiny side out. Cover with bandaid or foam.  Change dressings on Monday, Wednesday and Friday. Wear tubigrip to left lower leg during the day. May remove at night.     Elevate left lower leg when sitting, do not cross legs. Should you experience increased redness, swelling, pain, foul odor, size of wound(s), or have a temperature over 101 degrees please contact the [address] [place] [address] Road at [phone] or if after hours contact your primary care physician or go to the hospital emergency department.     Signed By: Chris Alvarez     April 16, 2021

## 2021-04-16 NOTE — WOUND CARE
04/16/21 1317 Wound Pretibial Left #1 Date First Assessed/Time First Assessed: 03/12/21 0938   Present on Hospital Admission: Yes  Primary Wound Type: Abrasion  Location: Pretibial  Wound Location Orientation: Left  Wound Description: #1 Wound Image Wound Etiology Traumatic Dressing Status Clean;Dry; Intact Cleansed Cleansed with saline Dressing/Treatment  
(hydrofera ready, border foam) Wound Length (cm) 2.5 cm Wound Width (cm) 1 cm Wound Depth (cm) 0.1 cm Wound Surface Area (cm^2) 2.5 cm^2 Change in Wound Size % 67.82 Wound Volume (cm^3) 0.25 cm^3 Wound Healing % 68 Wound Assessment Epithelialization;Granulation tissue Drainage Amount Moderate Drainage Description Serosanguinous Wound Odor None Ana-Wound/Incision Assessment Hemosiderin staining (brown yellow) Wound Thickness Description Full thickness

## 2021-05-07 ENCOUNTER — HOSPITAL ENCOUNTER (OUTPATIENT)
Dept: WOUND CARE | Age: 76
Discharge: HOME | End: 2021-05-07
Attending: SURGERY

## 2021-05-07 VITALS
DIASTOLIC BLOOD PRESSURE: 87 MMHG | HEART RATE: 73 BPM | RESPIRATION RATE: 18 BRPM | TEMPERATURE: 97.8 F | SYSTOLIC BLOOD PRESSURE: 154 MMHG | BODY MASS INDEX: 20.61 KG/M2 | WEIGHT: 136 LBS | HEIGHT: 68 IN

## 2021-05-07 RX ORDER — LATANOPROST 50 UG/ML
1 SOLUTION/ DROPS OPHTHALMIC
COMMUNITY
End: 2021-12-03 | Stop reason: ALTCHOICE

## 2021-05-07 NOTE — WOUND CARE
[address]  [address], [address]  Phone: [phone]  Fax: [phone]    Patient: Aren Arrington MRN: [id number]  SSN: [id number]    YOB: 1945  Age: 64 y.o. Sex: female       Return Appointment: Discharge from wound center at this time. Instructions: Right anterior lower leg  Wound is healed! Patient may shower as previously. Cover with protective bandage for about 1 week to allow scar to further mature. Discharge from wound center at this time. Should you experience increased redness, swelling, pain, foul odor, size of wound(s), or have a temperature over 101 degrees please contact the [address] [place] [address] Road at [phone] or if after hours contact your primary care physician or go to the hospital emergency department.     Signed By: Allison Ingram PT, [facility]     May 7, 2021

## 2021-05-07 NOTE — PROGRESS NOTES
05/07/21 1313   Wound Pretibial Left #1   Date First Assessed/Time First Assessed: 03/12/21 0938   Present on Hospital Admission: Yes  Primary Wound Type: Abrasion  Location: Pretibial  Wound Location Orientation: Left  Wound Description: #1   Wound Image    Wound Etiology Traumatic   Dressing Status Clean;Dry; Intact   Cleansed Cleansed with saline   Wound Length (cm) 0 cm   Wound Width (cm) 0 cm   Wound Depth (cm) 0 cm   Wound Surface Area (cm^2) 0 cm^2   Change in Wound Size % 100   Wound Volume (cm^3) 0 cm^3   Wound Healing % 100   Wound Assessment Epithelialization   Drainage Amount None   Wound Odor None

## 2021-12-09 ENCOUNTER — TRANSCRIBE ORDER (OUTPATIENT)
Dept: SCHEDULING | Age: 76
End: 2021-12-09

## 2021-12-09 DIAGNOSIS — Z12.31 VISIT FOR SCREENING MAMMOGRAM: Primary | ICD-10-CM

## 2022-01-11 ENCOUNTER — HOSPITAL ENCOUNTER (OUTPATIENT)
Dept: MAMMOGRAPHY | Age: 77
Discharge: HOME | End: 2022-01-11
Attending: FAMILY MEDICINE
Payer: MEDICARE

## 2022-01-11 DIAGNOSIS — Z12.31 VISIT FOR SCREENING MAMMOGRAM: ICD-10-CM

## 2022-01-11 PROCEDURE — 77063 BREAST TOMOSYNTHESIS BI: CPT

## 2022-06-20 NOTE — PROGRESS NOTES
6/21/2022     Subjective:     Chief Complaint   Patient presents with    Follow-up    COPD    Medication Refill     Needs refill of lisinopril       HPI:     Last note:  COPD doing well on her inhalers. She has not needed her rescue inhaler. She continues to smoke and is not interested in quitting. Recommend pulmonary evaluation but she refuses. We agreed that if she gets worse and I will refer her. Referral to rheumatology for very high risk for fracture due to osteoporosis. Continue Fosamax for now. Hypertension is controlled. Vitamin D levels are adequate. Cholesterol levels are stable reinforced diet and exercise. She is not on meds at this time. Osteoarthritis is stable. Thyroid levels are good. Reviewed interaction of propranolol and Trelegy. I think it will be okay for her to go ahead and try taking her propranolol for now. Due for fasting labs today. Hypertension   She is not exercising and is adherent to low salt diet. Cardiac symptoms: none. Cardiovascular risk factors: dyslipidemia and hypertension. Blood pressure is not measured at home. Has been off lisinopril for several months now. Hyperlipidemia  The patient is not following a low fat diet and is not exercising regularly. She is tolerating current treatment well and  is compliant. Patient is not having associated symptoms of shortness of breath, chest pain, rapid heart rate, irregular heart rate and dizziness    Patient labs are YOUR LAST LIPID PROFILE:   Lab Results   Component Value Date    CHOL 207 11/23/2021    HDL 76 11/23/2021    VLDL 17 11/23/2021       Vitamin D deficiency  Taking supplement on a regular basis. Current level:   Lab Results   Component Value Date    VITD25 43.3 11/23/2021      COPD  Doing ok, taking Trelegy daily. Will take symbicort as needed. Hypothyroidism  The patient is a 76 y.o. female who is seen for follow up of hypothyroidism. Current symptoms: none.  Patient denies change in energy level, diarrhea, heat / cold intolerance, nervousness, palpitations and weight changes. Symptoms are stable. Most recent lab:   Lab Results   Component Value Date    TSH 3.790 11/23/2021   She is not on meds at this time her TSH levels have been stable. Osteoporosis  Was referred to Rheumatology did not go but states will call them. Taking alendronate as prescribed. OA  About the same, back bothers her    Smoker  Still smoking about . 25 to .5 PPD. Not interested in quitting. Interim History: none     Review of Systems   Constitutional: Negative for fatigue. HENT: Negative for congestion. Respiratory: Negative for shortness of breath. Cardiovascular: Negative for chest pain. Gastrointestinal: Negative for abdominal pain and blood in stool. Genitourinary: Negative for difficulty urinating. Musculoskeletal: Positive for arthralgias, joint swelling and neck pain. Skin: Negative for rash. Neurological: Negative for headaches. Hematological: Bruises/bleeds easily.          Past Medical History:   Diagnosis Date    Chronic obstructive pulmonary disease ([address]     uses inhalers; pt states has no pulmonologist    Chronic pain     lower back    Colitis 2001 & 2014    pt states no problems now 2/1/16    Diverticulosis of colon (without mention of hemorrhage) 5/11/2015    Fibrocystic breast     bilat breasts    Glaucoma     uses eye drops    H/O echocardiogram 3/27/14    EF 55%-60%; mild pulmonary artery HTN    History of ischemic colitis 2001    Hyperlipemia 5/11/2015    pt states no problems now 2/1/16    Hypertension     managed w/med    Occlusion and stenosis of carotid artery without mention of cerebral infarction 5/11/2015    Osteoarthritis     Osteoarthrosis, unspecified whether generalized or localized, hand 5/11/2015    Osteoporosis 2020    Osteoporosis, unspecified 5/11/2015    Post-operative nausea and vomiting     pt states hx of; pt states no current problems after left shoulder replacement    Proteinuria 5/11/2015    Sciatica of right side     takes lyrica    Shingles 1980    Status post total left knee replacement 2/24/2016    Syncope 1/2014    X1 episode    Tobacco use disorder 5/11/2015    1/2 ppd for 50 yrs    Vitamin D deficiency 5/11/2015    takes vitamin d supplement     Past Surgical History:   Procedure Laterality Date    APPENDECTOMY      BREAST BIOPSY Right     X2 benign biopsies    COLONOSCOPY  2014    HYSTERECTOMY (CERVIX STATUS UNKNOWN)  1976    due to endometriosis    SHOULDER ARTHROPLASTY Left 7/30/2014    TOTAL KNEE ARTHROPLASTY Left 2/24/2016     Family History   Problem Relation Age of Onset    Heart Disease Father     Hypertension Father     Heart Attack Father     Alcohol Abuse Mother     Cancer Mother         MOUTH,THROAT [place]    Breast Cancer Neg Hx      Social History     Socioeconomic History    Marital status:      Spouse name: None    Number of children: None    Years of education: None    Highest education level: None   Occupational History    None   Tobacco Use    Smoking status: Current Every Day Smoker     Packs/day: 0.25     Types: Cigarettes    Smokeless tobacco: Never Used   Vaping Use    Vaping Use: Never used   Substance and Sexual Activity    Alcohol use: No    Drug use: No    Sexual activity: Not Currently     Birth control/protection: None   Other Topics Concern    None   Social History Narrative    Pt , lives with . Retired. Worked for  for 18 years. Social Determinants of Health     Financial Resource Strain: Low Risk     Difficulty of Paying Living Expenses: Not hard at all   Food Insecurity: No Food Insecurity    Worried About Running Out of Food in the Last Year: Never true    Kathryn of Food in the Last Year: Never true   Transportation Needs:     Lack of Transportation (Medical): Not on file    Lack of Transportation (Non-Medical):  Not on file   Physical Physical Exam  Vitals and nursing note reviewed. Constitutional:       General: She is not in acute distress. Appearance: Normal appearance. She is normal weight. She is not ill-appearing, toxic-appearing or diaphoretic. HENT:      Head: Normocephalic. Nose: Nose normal.   Eyes:      Extraocular Movements: Extraocular movements intact. Cardiovascular:      Rate and Rhythm: Normal rate and regular rhythm. Heart sounds: No murmur heard. Pulmonary:      Effort: Pulmonary effort is normal.      Breath sounds: Examination of the right-lower field reveals decreased breath sounds. Decreased breath sounds present. No wheezing, rhonchi or rales. Musculoskeletal:         General: No deformity. Skin:     General: Skin is warm. Comments: Multiple shallow skin tears in her lower extremities and her forearms. Neurological:      General: No focal deficit present. Mental Status: She is alert. Psychiatric:         Mood and Affect: Mood normal.           Assessment and Plan:      Diagnosis Orders   1. Essential hypertension  lisinopril (PRINIVIL;ZESTRIL) 20 MG tablet   2. Pulmonary emphysema, unspecified emphysema type ([address]  fluticasone-umeclidin-vilant (TRELEGY ELLIPTA) 100-62.5-25 MCG/INH AEPB    budesonide-formoterol (SYMBICORT) 160-4.5 MCG/ACT AERO   3. Age-related osteoporosis without current pathological fracture     4. Vitamin D deficiency  Vitamin D 25 Hydroxy    Vitamin D 25 Hydroxy   5. Primary osteoarthritis involving multiple joints     6. Pure hypercholesterolemia  CBC with Auto Differential    Comprehensive Metabolic Panel    Lipid Panel    Lipid Panel    Comprehensive Metabolic Panel    CBC with Auto Differential   7. Tobacco use disorder, continuous     8. Benign essential tremor  propranolol (INDERAL) 10 MG tablet   9. Hypothyroidism due to acquired atrophy of thyroid  TSH    TSH   10.  Need for hepatitis C screening test  Hepatitis C Antibody    Hepatitis C Antibody Data reviewed:  Previous notes, labs and Specialists notes, if any, reviewed and discussed with patient. She will get blood work this morning and we will call her with the results. Hypertension elevated today. She was not on any blood pressure medicine for the past few months. Refill sent to pharmacy. Emphysema advised to continue Trelegy every day and use albuterol as rescue Symbicort. Advised to call her rheumatologist for an appointment. She is taking Fosamax regularly. Continue vitamin D supplement. Osteoarthritis seems to be getting worse especially in her right thumb. Start diclofenac gel to affected area 3-4 times a day as  Cholesterol stable. Continues to smoke and is not interested in quitting. Tremor okay for her to try propranolol once a day for her tremors. She is aware of interaction between Trelegy and propranolol. Hypothyroidism no meds continue to monitor TSH. Will do hep C test today. We will call her with results since they are available. Opportunity to ask questions was offered and were answered to the best of my ability. Over 50% of today's office visit was spent in face to face time reviewing test results, prognosis, importance of compliance, education about disease process, benefits of medications, instructions for management of disease and follow up plans. Total face to face time spent with patient was at least 25 minutes      There are no Patient Instructions on file for this visit. No follow-up provider specified.     Devin Lozoya MD

## 2022-06-21 ENCOUNTER — OFFICE VISIT (OUTPATIENT)
Dept: INTERNAL MEDICINE CLINIC | Facility: CLINIC | Age: 77
End: 2022-06-21
Payer: MEDICARE

## 2022-06-21 VITALS
WEIGHT: 138 LBS | HEART RATE: 88 BPM | HEIGHT: 68 IN | DIASTOLIC BLOOD PRESSURE: 100 MMHG | SYSTOLIC BLOOD PRESSURE: 160 MMHG | BODY MASS INDEX: 20.92 KG/M2 | OXYGEN SATURATION: 99 %

## 2022-06-21 DIAGNOSIS — J43.9 PULMONARY EMPHYSEMA, UNSPECIFIED EMPHYSEMA TYPE (HCC): ICD-10-CM

## 2022-06-21 DIAGNOSIS — I10 ESSENTIAL HYPERTENSION: Primary | ICD-10-CM

## 2022-06-21 DIAGNOSIS — E03.4 HYPOTHYROIDISM DUE TO ACQUIRED ATROPHY OF THYROID: ICD-10-CM

## 2022-06-21 DIAGNOSIS — E55.9 VITAMIN D DEFICIENCY: ICD-10-CM

## 2022-06-21 DIAGNOSIS — M81.0 AGE-RELATED OSTEOPOROSIS WITHOUT CURRENT PATHOLOGICAL FRACTURE: ICD-10-CM

## 2022-06-21 DIAGNOSIS — E78.00 PURE HYPERCHOLESTEROLEMIA: ICD-10-CM

## 2022-06-21 DIAGNOSIS — M15.9 PRIMARY OSTEOARTHRITIS INVOLVING MULTIPLE JOINTS: ICD-10-CM

## 2022-06-21 DIAGNOSIS — G25.0 BENIGN ESSENTIAL TREMOR: ICD-10-CM

## 2022-06-21 DIAGNOSIS — F17.209 TOBACCO USE DISORDER, CONTINUOUS: ICD-10-CM

## 2022-06-21 DIAGNOSIS — Z11.59 NEED FOR HEPATITIS C SCREENING TEST: ICD-10-CM

## 2022-06-21 LAB
25(OH)D3 SERPL-MCNC: 24.1 NG/ML (ref 30–100)
ALBUMIN SERPL-MCNC: 4 G/DL (ref 3.2–4.6)
ALBUMIN/GLOB SERPL: 1.1 {RATIO} (ref 1.2–3.5)
ALP SERPL-CCNC: 91 U/L (ref 50–136)
ALT SERPL-CCNC: 22 U/L (ref 12–65)
ANION GAP SERPL CALC-SCNC: 7 MMOL/L (ref 7–16)
AST SERPL-CCNC: 18 U/L (ref 15–37)
BASOPHILS # BLD: 0 K/UL (ref 0–0.2)
BASOPHILS NFR BLD: 1 % (ref 0–2)
BILIRUB SERPL-MCNC: 0.6 MG/DL (ref 0.2–1.1)
BUN SERPL-MCNC: 25 MG/DL (ref 8–23)
CALCIUM SERPL-MCNC: 9.1 MG/DL (ref 8.3–10.4)
CHLORIDE SERPL-SCNC: 108 MMOL/L (ref 98–107)
CHOLEST SERPL-MCNC: 205 MG/DL
CO2 SERPL-SCNC: 26 MMOL/L (ref 21–32)
CREAT SERPL-MCNC: 0.8 MG/DL (ref 0.6–1)
DIFFERENTIAL METHOD BLD: ABNORMAL
EOSINOPHIL # BLD: 0.1 K/UL (ref 0–0.8)
EOSINOPHIL NFR BLD: 2 % (ref 0.5–7.8)
ERYTHROCYTE [DISTWIDTH] IN BLOOD BY AUTOMATED COUNT: 14.8 % (ref 11.9–14.6)
GLOBULIN SER CALC-MCNC: 3.6 G/DL (ref 2.3–3.5)
GLUCOSE SERPL-MCNC: 79 MG/DL (ref 65–100)
HCT VFR BLD AUTO: 43.6 % (ref 35.8–46.3)
HCV AB SER QL: NONREACTIVE
HDLC SERPL-MCNC: 75 MG/DL (ref 40–60)
HDLC SERPL: 2.7 {RATIO}
HGB BLD-MCNC: 14.3 G/DL (ref 11.7–15.4)
IMM GRANULOCYTES # BLD AUTO: 0 K/UL (ref 0–0.5)
IMM GRANULOCYTES NFR BLD AUTO: 0 % (ref 0–5)
LDLC SERPL CALC-MCNC: 111.4 MG/DL
LYMPHOCYTES # BLD: 1.9 K/UL (ref 0.5–4.6)
LYMPHOCYTES NFR BLD: 23 % (ref 13–44)
MCH RBC QN AUTO: 28.9 PG (ref 26.1–32.9)
MCHC RBC AUTO-ENTMCNC: 32.8 G/DL (ref 31.4–35)
MCV RBC AUTO: 88.1 FL (ref 79.6–97.8)
MONOCYTES # BLD: 0.8 K/UL (ref 0.1–1.3)
MONOCYTES NFR BLD: 9 % (ref 4–12)
NEUTS SEG # BLD: 5.3 K/UL (ref 1.7–8.2)
NEUTS SEG NFR BLD: 65 % (ref 43–78)
NRBC # BLD: 0 K/UL (ref 0–0.2)
PLATELET # BLD AUTO: 235 K/UL (ref 150–450)
PMV BLD AUTO: 9.8 FL (ref 9.4–12.3)
POTASSIUM SERPL-SCNC: 4.6 MMOL/L (ref 3.5–5.1)
PROT SERPL-MCNC: 7.6 G/DL (ref 6.3–8.2)
RBC # BLD AUTO: 4.95 M/UL (ref 4.05–5.2)
SODIUM SERPL-SCNC: 141 MMOL/L (ref 136–145)
TRIGL SERPL-MCNC: 93 MG/DL (ref 35–150)
TSH, 3RD GENERATION: 3.79 UIU/ML (ref 0.36–3.74)
VLDLC SERPL CALC-MCNC: 18.6 MG/DL (ref 6–23)
WBC # BLD AUTO: 8.2 K/UL (ref 4.3–11.1)

## 2022-06-21 PROCEDURE — 1090F PRES/ABSN URINE INCON ASSESS: CPT | Performed by: FAMILY MEDICINE

## 2022-06-21 PROCEDURE — 99214 OFFICE O/P EST MOD 30 MIN: CPT | Performed by: FAMILY MEDICINE

## 2022-06-21 PROCEDURE — 3023F SPIROM DOC REV: CPT | Performed by: FAMILY MEDICINE

## 2022-06-21 PROCEDURE — G8427 DOCREV CUR MEDS BY ELIG CLIN: HCPCS | Performed by: FAMILY MEDICINE

## 2022-06-21 PROCEDURE — G8399 PT W/DXA RESULTS DOCUMENT: HCPCS | Performed by: FAMILY MEDICINE

## 2022-06-21 PROCEDURE — G8420 CALC BMI NORM PARAMETERS: HCPCS | Performed by: FAMILY MEDICINE

## 2022-06-21 PROCEDURE — 1123F ACP DISCUSS/DSCN MKR DOCD: CPT | Performed by: FAMILY MEDICINE

## 2022-06-21 PROCEDURE — 4004F PT TOBACCO SCREEN RCVD TLK: CPT | Performed by: FAMILY MEDICINE

## 2022-06-21 RX ORDER — BUDESONIDE AND FORMOTEROL FUMARATE DIHYDRATE 160; 4.5 UG/1; UG/1
2 AEROSOL RESPIRATORY (INHALATION) 2 TIMES DAILY
Qty: 10.2 G | Refills: 1 | Status: SHIPPED | OUTPATIENT
Start: 2022-06-21

## 2022-06-21 RX ORDER — LISINOPRIL 20 MG/1
20 TABLET ORAL DAILY
Qty: 90 TABLET | Refills: 3 | Status: SHIPPED | OUTPATIENT
Start: 2022-06-21

## 2022-06-21 RX ORDER — PROPRANOLOL HYDROCHLORIDE 10 MG/1
10 TABLET ORAL 3 TIMES DAILY PRN
Qty: 90 TABLET | Refills: 1 | Status: SHIPPED | OUTPATIENT
Start: 2022-06-21

## 2022-06-21 SDOH — ECONOMIC STABILITY: FOOD INSECURITY: WITHIN THE PAST 12 MONTHS, THE FOOD YOU BOUGHT JUST DIDN'T LAST AND YOU DIDN'T HAVE MONEY TO GET MORE.: NEVER TRUE

## 2022-06-21 SDOH — ECONOMIC STABILITY: FOOD INSECURITY: WITHIN THE PAST 12 MONTHS, YOU WORRIED THAT YOUR FOOD WOULD RUN OUT BEFORE YOU GOT MONEY TO BUY MORE.: NEVER TRUE

## 2022-06-21 ASSESSMENT — PATIENT HEALTH QUESTIONNAIRE - PHQ9
SUM OF ALL RESPONSES TO PHQ QUESTIONS 1-9: 0
1. LITTLE INTEREST OR PLEASURE IN DOING THINGS: 0
SUM OF ALL RESPONSES TO PHQ9 QUESTIONS 1 & 2: 0
2. FEELING DOWN, DEPRESSED OR HOPELESS: 0

## 2022-06-21 ASSESSMENT — ENCOUNTER SYMPTOMS
ABDOMINAL PAIN: 0
BLOOD IN STOOL: 0
SHORTNESS OF BREATH: 0

## 2022-06-21 ASSESSMENT — SOCIAL DETERMINANTS OF HEALTH (SDOH): HOW HARD IS IT FOR YOU TO PAY FOR THE VERY BASICS LIKE FOOD, HOUSING, MEDICAL CARE, AND HEATING?: NOT HARD AT ALL

## 2022-07-06 DIAGNOSIS — M81.0 AGE-RELATED OSTEOPOROSIS WITHOUT CURRENT PATHOLOGICAL FRACTURE: Primary | ICD-10-CM

## 2022-07-06 NOTE — TELEPHONE ENCOUNTER
Requested Prescriptions     Pending Prescriptions Disp Refills    estrogens, conjugated, (PREMARIN) 0.625 MG tablet 30 tablet 6     Sig: Take 1 tablet by mouth daily     [facility]ose [facility] and [facility]

## 2022-11-09 DIAGNOSIS — J43.9 PULMONARY EMPHYSEMA, UNSPECIFIED EMPHYSEMA TYPE (HCC): ICD-10-CM

## 2022-11-09 NOTE — TELEPHONE ENCOUNTER
Pt is requesting a refill of Symbicort 106-4.5mcg/act aero    Pharmacy Info:    [facility], [id number]

## 2022-11-10 RX ORDER — BUDESONIDE AND FORMOTEROL FUMARATE DIHYDRATE 160; 4.5 UG/1; UG/1
2 AEROSOL RESPIRATORY (INHALATION) 2 TIMES DAILY
Qty: 10.2 G | Refills: 1 | Status: SHIPPED | OUTPATIENT
Start: 2022-11-10

## 2022-12-07 DIAGNOSIS — I10 ESSENTIAL HYPERTENSION: Primary | ICD-10-CM

## 2022-12-07 DIAGNOSIS — E03.4 ATROPHY OF THYROID (ACQUIRED): ICD-10-CM

## 2022-12-07 DIAGNOSIS — E55.9 VITAMIN D DEFICIENCY: ICD-10-CM

## 2022-12-07 DIAGNOSIS — E78.5 HYPERLIPIDEMIA, UNSPECIFIED HYPERLIPIDEMIA TYPE: ICD-10-CM

## 2023-02-15 DIAGNOSIS — J43.9 PULMONARY EMPHYSEMA, UNSPECIFIED EMPHYSEMA TYPE (HCC): ICD-10-CM

## 2023-02-15 DIAGNOSIS — M81.0 AGE-RELATED OSTEOPOROSIS WITHOUT CURRENT PATHOLOGICAL FRACTURE: ICD-10-CM

## 2023-02-16 RX ORDER — BUDESONIDE AND FORMOTEROL FUMARATE DIHYDRATE 160; 4.5 UG/1; UG/1
2 AEROSOL RESPIRATORY (INHALATION) 2 TIMES DAILY
Qty: 10.2 G | Refills: 0 | Status: SHIPPED | OUTPATIENT
Start: 2023-02-16

## 2023-02-23 NOTE — TELEPHONE ENCOUNTER
----- Message from [facility] sent at 2/23/2023 11:33 AM EST -----  Subject: Refill Request    QUESTIONS  Name of Medication? alendronate (FOSAMAX) 70 MG tablet  Patient-reported dosage and instructions? Once a week  How many days do you have left? 0  [id number]  Pharmacy phone number (if available)? [phone]  ---------------------------------------------------------------------------  --------------  Lennie FRAGOSO  What is the best way for the office to contact you? OK to leave message on   voicemail  Preferred Call Back Phone Number? [phone]  ---------------------------------------------------------------------------  --------------  SCRIPT ANSWERS  Relationship to Patient?  Self

## 2023-02-23 NOTE — TELEPHONE ENCOUNTER
----- Message from [facility] sent at 2/23/2023 11:31 AM EST -----  Subject: Message to Provider    QUESTIONS  Information for Provider? Please call patient to reschedule her lab 6/22   lab appt  ---------------------------------------------------------------------------  --------------  [id number]  [phone]; OK to leave message on voicemail  ---------------------------------------------------------------------------  --------------  SCRIPT ANSWERS  Relationship to Patient?  Self

## 2023-02-23 NOTE — TELEPHONE ENCOUNTER
Pt requesting refill of medication. Medication pended.       Pt last seen in 06/2022 and will return in 06/2023

## 2023-02-24 RX ORDER — ALENDRONATE SODIUM 70 MG/1
70 TABLET ORAL
Qty: 4 TABLET | Refills: 4 | Status: SHIPPED | OUTPATIENT
Start: 2023-02-24

## 2023-04-04 ENCOUNTER — TELEPHONE (OUTPATIENT)
Dept: INTERNAL MEDICINE CLINIC | Facility: CLINIC | Age: 78
End: 2023-04-04

## 2023-04-04 DIAGNOSIS — M81.0 AGE-RELATED OSTEOPOROSIS WITHOUT CURRENT PATHOLOGICAL FRACTURE: Primary | ICD-10-CM

## 2023-04-04 RX ORDER — ESTRADIOL 1 MG/1
1 TABLET ORAL DAILY
Qty: 90 TABLET | Refills: 1 | Status: SHIPPED | OUTPATIENT
Start: 2023-04-04

## 2023-05-02 NOTE — TELEPHONE ENCOUNTER
Patient request refill    RX- TRELOGY  #90 DAY SUPPLY          [facility] ON [facility]

## 2023-06-20 DIAGNOSIS — E78.5 HYPERLIPIDEMIA, UNSPECIFIED HYPERLIPIDEMIA TYPE: ICD-10-CM

## 2023-06-20 DIAGNOSIS — E55.9 VITAMIN D DEFICIENCY: ICD-10-CM

## 2023-06-20 DIAGNOSIS — I10 ESSENTIAL HYPERTENSION: ICD-10-CM

## 2023-06-20 DIAGNOSIS — E03.4 ATROPHY OF THYROID (ACQUIRED): ICD-10-CM

## 2023-06-20 LAB
ERYTHROCYTE [DISTWIDTH] IN BLOOD BY AUTOMATED COUNT: 14.8 % (ref 11.9–14.6)
HCT VFR BLD AUTO: 47 % (ref 35.8–46.3)
HGB BLD-MCNC: 14.8 G/DL (ref 11.7–15.4)
MCH RBC QN AUTO: 28.8 PG (ref 26.1–32.9)
MCHC RBC AUTO-ENTMCNC: 31.5 G/DL (ref 31.4–35)
MCV RBC AUTO: 91.6 FL (ref 82–102)
NRBC # BLD: 0 K/UL (ref 0–0.2)
PLATELET # BLD AUTO: 222 K/UL (ref 150–450)
PMV BLD AUTO: 9.9 FL (ref 9.4–12.3)
RBC # BLD AUTO: 5.13 M/UL (ref 4.05–5.2)
WBC # BLD AUTO: 7.1 K/UL (ref 4.3–11.1)

## 2023-06-21 LAB
25(OH)D3 SERPL-MCNC: 23.1 NG/ML (ref 30–100)
ALBUMIN SERPL-MCNC: 3.7 G/DL (ref 3.2–4.6)
ALBUMIN/GLOB SERPL: 1 (ref 0.4–1.6)
ALP SERPL-CCNC: 73 U/L (ref 50–136)
ALT SERPL-CCNC: 20 U/L (ref 12–65)
ANION GAP SERPL CALC-SCNC: 3 MMOL/L (ref 2–11)
AST SERPL-CCNC: 19 U/L (ref 15–37)
BILIRUB SERPL-MCNC: 0.4 MG/DL (ref 0.2–1.1)
BUN SERPL-MCNC: 25 MG/DL (ref 8–23)
CALCIUM SERPL-MCNC: 9.5 MG/DL (ref 8.3–10.4)
CHLORIDE SERPL-SCNC: 110 MMOL/L (ref 101–110)
CHOLEST SERPL-MCNC: 176 MG/DL
CO2 SERPL-SCNC: 27 MMOL/L (ref 21–32)
CREAT SERPL-MCNC: 0.8 MG/DL (ref 0.6–1)
GLOBULIN SER CALC-MCNC: 3.6 G/DL (ref 2.8–4.5)
GLUCOSE SERPL-MCNC: 82 MG/DL (ref 65–100)
HDLC SERPL-MCNC: 65 MG/DL (ref 40–60)
HDLC SERPL: 2.7
LDLC SERPL CALC-MCNC: 92.8 MG/DL
POTASSIUM SERPL-SCNC: 4.8 MMOL/L (ref 3.5–5.1)
PROT SERPL-MCNC: 7.3 G/DL (ref 6.3–8.2)
SODIUM SERPL-SCNC: 140 MMOL/L (ref 133–143)
TRIGL SERPL-MCNC: 91 MG/DL (ref 35–150)
TSH, 3RD GENERATION: 3.28 UIU/ML (ref 0.36–3.74)
VLDLC SERPL CALC-MCNC: 18.2 MG/DL (ref 6–23)

## 2023-06-29 ENCOUNTER — OFFICE VISIT (OUTPATIENT)
Dept: INTERNAL MEDICINE CLINIC | Facility: CLINIC | Age: 78
End: 2023-06-29
Payer: MEDICARE

## 2023-06-29 VITALS
HEART RATE: 88 BPM | SYSTOLIC BLOOD PRESSURE: 138 MMHG | WEIGHT: 129.5 LBS | OXYGEN SATURATION: 89 % | DIASTOLIC BLOOD PRESSURE: 78 MMHG | BODY MASS INDEX: 19.63 KG/M2 | HEIGHT: 68 IN

## 2023-06-29 DIAGNOSIS — G25.0 BENIGN ESSENTIAL TREMOR: ICD-10-CM

## 2023-06-29 DIAGNOSIS — F17.209 TOBACCO USE DISORDER, CONTINUOUS: ICD-10-CM

## 2023-06-29 DIAGNOSIS — E78.00 PURE HYPERCHOLESTEROLEMIA: ICD-10-CM

## 2023-06-29 DIAGNOSIS — E55.9 VITAMIN D DEFICIENCY: ICD-10-CM

## 2023-06-29 DIAGNOSIS — Z00.00 MEDICARE ANNUAL WELLNESS VISIT, SUBSEQUENT: Primary | ICD-10-CM

## 2023-06-29 DIAGNOSIS — J43.9 PULMONARY EMPHYSEMA, UNSPECIFIED EMPHYSEMA TYPE (HCC): ICD-10-CM

## 2023-06-29 DIAGNOSIS — J44.1 COPD EXACERBATION (HCC): ICD-10-CM

## 2023-06-29 DIAGNOSIS — I10 ESSENTIAL HYPERTENSION: ICD-10-CM

## 2023-06-29 DIAGNOSIS — M81.0 AGE-RELATED OSTEOPOROSIS WITHOUT CURRENT PATHOLOGICAL FRACTURE: ICD-10-CM

## 2023-06-29 DIAGNOSIS — Z23 NEED FOR PNEUMOCOCCAL VACCINE: ICD-10-CM

## 2023-06-29 DIAGNOSIS — N95.1 MENOPAUSAL SYNDROME ON HORMONE REPLACEMENT THERAPY: ICD-10-CM

## 2023-06-29 DIAGNOSIS — Z79.890 MENOPAUSAL SYNDROME ON HORMONE REPLACEMENT THERAPY: ICD-10-CM

## 2023-06-29 PROCEDURE — 99214 OFFICE O/P EST MOD 30 MIN: CPT | Performed by: FAMILY MEDICINE

## 2023-06-29 PROCEDURE — 1090F PRES/ABSN URINE INCON ASSESS: CPT | Performed by: FAMILY MEDICINE

## 2023-06-29 PROCEDURE — G0439 PPPS, SUBSEQ VISIT: HCPCS | Performed by: FAMILY MEDICINE

## 2023-06-29 PROCEDURE — 3078F DIAST BP <80 MM HG: CPT | Performed by: FAMILY MEDICINE

## 2023-06-29 PROCEDURE — 4004F PT TOBACCO SCREEN RCVD TLK: CPT | Performed by: FAMILY MEDICINE

## 2023-06-29 PROCEDURE — G8427 DOCREV CUR MEDS BY ELIG CLIN: HCPCS | Performed by: FAMILY MEDICINE

## 2023-06-29 PROCEDURE — G8420 CALC BMI NORM PARAMETERS: HCPCS | Performed by: FAMILY MEDICINE

## 2023-06-29 PROCEDURE — 3023F SPIROM DOC REV: CPT | Performed by: FAMILY MEDICINE

## 2023-06-29 PROCEDURE — 3075F SYST BP GE 130 - 139MM HG: CPT | Performed by: FAMILY MEDICINE

## 2023-06-29 PROCEDURE — G8399 PT W/DXA RESULTS DOCUMENT: HCPCS | Performed by: FAMILY MEDICINE

## 2023-06-29 PROCEDURE — 1123F ACP DISCUSS/DSCN MKR DOCD: CPT | Performed by: FAMILY MEDICINE

## 2023-06-29 RX ORDER — PREDNISONE 10 MG/1
TABLET ORAL
Qty: 1 EACH | Refills: 0 | Status: SHIPPED | OUTPATIENT
Start: 2023-06-29

## 2023-06-29 RX ORDER — PROPRANOLOL HYDROCHLORIDE 10 MG/1
10 TABLET ORAL 3 TIMES DAILY PRN
Qty: 90 TABLET | Refills: 1 | Status: SHIPPED | OUTPATIENT
Start: 2023-06-29

## 2023-06-29 RX ORDER — ESTRADIOL 1 MG/1
1 TABLET ORAL DAILY
Qty: 90 TABLET | Refills: 1 | Status: SHIPPED | OUTPATIENT
Start: 2023-06-29

## 2023-06-29 RX ORDER — FLUTICASONE PROPIONATE AND SALMETEROL 250; 50 UG/1; UG/1
1 POWDER RESPIRATORY (INHALATION) DAILY
Qty: 60 EACH | Refills: 5 | Status: SHIPPED | OUTPATIENT
Start: 2023-06-29

## 2023-06-29 RX ORDER — BUDESONIDE AND FORMOTEROL FUMARATE DIHYDRATE 160; 4.5 UG/1; UG/1
2 AEROSOL RESPIRATORY (INHALATION) 2 TIMES DAILY
Qty: 10.2 G | Refills: 0 | Status: CANCELLED | OUTPATIENT
Start: 2023-06-29

## 2023-06-29 RX ORDER — LISINOPRIL 20 MG/1
20 TABLET ORAL DAILY
Qty: 90 TABLET | Refills: 3 | Status: SHIPPED | OUTPATIENT
Start: 2023-06-29

## 2023-06-29 RX ORDER — ERGOCALCIFEROL 1.25 MG/1
50000 CAPSULE ORAL WEEKLY
Qty: 4 CAPSULE | Refills: 5 | Status: SHIPPED | OUTPATIENT
Start: 2023-06-29

## 2023-06-29 RX ORDER — ALENDRONATE SODIUM 70 MG/1
70 TABLET ORAL
Qty: 4 TABLET | Refills: 4 | Status: SHIPPED | OUTPATIENT
Start: 2023-06-29

## 2023-06-29 SDOH — ECONOMIC STABILITY: INCOME INSECURITY: HOW HARD IS IT FOR YOU TO PAY FOR THE VERY BASICS LIKE FOOD, HOUSING, MEDICAL CARE, AND HEATING?: NOT VERY HARD

## 2023-06-29 SDOH — ECONOMIC STABILITY: HOUSING INSECURITY
IN THE LAST 12 MONTHS, WAS THERE A TIME WHEN YOU DID NOT HAVE A STEADY PLACE TO SLEEP OR SLEPT IN A SHELTER (INCLUDING NOW)?: NO

## 2023-06-29 SDOH — ECONOMIC STABILITY: FOOD INSECURITY: WITHIN THE PAST 12 MONTHS, THE FOOD YOU BOUGHT JUST DIDN'T LAST AND YOU DIDN'T HAVE MONEY TO GET MORE.: NEVER TRUE

## 2023-06-29 SDOH — ECONOMIC STABILITY: FOOD INSECURITY: WITHIN THE PAST 12 MONTHS, YOU WORRIED THAT YOUR FOOD WOULD RUN OUT BEFORE YOU GOT MONEY TO BUY MORE.: NEVER TRUE

## 2023-06-29 ASSESSMENT — PATIENT HEALTH QUESTIONNAIRE - PHQ9
1. LITTLE INTEREST OR PLEASURE IN DOING THINGS: 0
SUM OF ALL RESPONSES TO PHQ QUESTIONS 1-9: 0
SUM OF ALL RESPONSES TO PHQ9 QUESTIONS 1 & 2: 0
2. FEELING DOWN, DEPRESSED OR HOPELESS: 0
SUM OF ALL RESPONSES TO PHQ QUESTIONS 1-9: 0

## 2023-06-29 ASSESSMENT — LIFESTYLE VARIABLES: HOW OFTEN DO YOU HAVE A DRINK CONTAINING ALCOHOL: NEVER

## 2023-07-05 ENCOUNTER — TELEPHONE (OUTPATIENT)
Dept: INTERNAL MEDICINE CLINIC | Facility: CLINIC | Age: 78
End: 2023-07-05

## 2023-07-05 DIAGNOSIS — F41.9 ANXIETY: Primary | ICD-10-CM

## 2023-07-05 NOTE — TELEPHONE ENCOUNTER
Pt needs to be put on Dr Yuan Spivey schedule for a VV or in person visit to discuss anxiety. Pt states she is having anxiety about upcoming home move and is feeling anxious about the future. She would like a rx for Valium.  Appt needed

## 2023-07-06 RX ORDER — DIAZEPAM 2 MG/1
2 TABLET ORAL EVERY 8 HOURS PRN
Qty: 15 TABLET | Refills: 0 | Status: SHIPPED | OUTPATIENT
Start: 2023-07-06 | End: 2023-07-11

## 2023-07-06 NOTE — TELEPHONE ENCOUNTER
Pt does not want to come in or have a VV regarding her anxiety about moving. Pt would like a Rx of Valium sent to the pharmacy to help with her move anxiety.

## 2023-10-17 DIAGNOSIS — G25.0 BENIGN ESSENTIAL TREMOR: ICD-10-CM

## 2023-10-17 RX ORDER — PROPRANOLOL HYDROCHLORIDE 10 MG/1
10 TABLET ORAL 3 TIMES DAILY PRN
Qty: 90 TABLET | Refills: 1 | Status: SHIPPED | OUTPATIENT
Start: 2023-10-17

## 2023-11-27 DIAGNOSIS — J43.9 PULMONARY EMPHYSEMA, UNSPECIFIED EMPHYSEMA TYPE (HCC): ICD-10-CM

## 2023-11-28 RX ORDER — FLUTICASONE PROPIONATE AND SALMETEROL 250; 50 UG/1; UG/1
1 POWDER RESPIRATORY (INHALATION) DAILY
Qty: 60 EACH | Refills: 1 | Status: SHIPPED | OUTPATIENT
Start: 2023-11-28

## 2023-11-29 ENCOUNTER — TELEPHONE (OUTPATIENT)
Dept: INTERNAL MEDICINE CLINIC | Facility: CLINIC | Age: 78
End: 2023-11-29

## 2023-11-30 ENCOUNTER — TELEPHONE (OUTPATIENT)
Dept: INTERNAL MEDICINE CLINIC | Facility: CLINIC | Age: 78
End: 2023-11-30

## 2023-11-30 DIAGNOSIS — J43.9 PULMONARY EMPHYSEMA, UNSPECIFIED EMPHYSEMA TYPE (HCC): Primary | ICD-10-CM

## 2023-11-30 RX ORDER — ALBUTEROL SULFATE 90 UG/1
2 AEROSOL, METERED RESPIRATORY (INHALATION) EVERY 6 HOURS PRN
Qty: 18 G | Refills: 3 | Status: SHIPPED | OUTPATIENT
Start: 2023-11-30

## 2023-11-30 NOTE — TELEPHONE ENCOUNTER
Contacted patient regarding Rx refill, Rx is , she requested to \"can you tell her (referring to Dr. Jose Rafael Albert) to call me please, she knows me. \"

## 2024-02-13 DIAGNOSIS — J43.9 PULMONARY EMPHYSEMA, UNSPECIFIED EMPHYSEMA TYPE (HCC): ICD-10-CM

## 2024-02-13 NOTE — TELEPHONE ENCOUNTER
Patient requesting refill on     [facility]    Pharmacy-[facility]

## 2024-02-17 RX ORDER — FLUTICASONE PROPIONATE AND SALMETEROL 250; 50 UG/1; UG/1
1 POWDER RESPIRATORY (INHALATION) DAILY
Qty: 60 EACH | Refills: 0 | Status: SHIPPED | OUTPATIENT
Start: 2024-02-17

## 2024-03-12 DIAGNOSIS — G25.0 BENIGN ESSENTIAL TREMOR: ICD-10-CM

## 2024-03-13 RX ORDER — PROPRANOLOL HYDROCHLORIDE 10 MG/1
10 TABLET ORAL 3 TIMES DAILY PRN
Qty: 90 TABLET | Refills: 1 | Status: SHIPPED | OUTPATIENT
Start: 2024-03-13

## 2024-03-13 NOTE — TELEPHONE ENCOUNTER
Patient is schedule for a follow up with PCP on 3/22/24. Patient is wanting to know if she can get a short term supply to make it until her appointment.

## 2024-03-19 NOTE — PROGRESS NOTES
3/22/2024     Subjective:     Chief Complaint   Patient presents with    6 Month Follow-Up       HPI:     Emphysema  Currently on Trelegy and Advair, albuterol as needed. Doing well on this combination.  Was initially on Advair alone but when Trelegy was prescribed she continues to do well  Is doing better.  She does not see pulmonology.      Hypertension   She is not exercising and is adherent to low salt diet.       Cardiac symptoms: None.  Cardiovascular risk factors: hypertension.  Blood pressure occasional.      Vitamin D deficiency  Taking supplement on a occasional basis. Current level:   Lab Results   Component Value Date/Time    VITD25 23.1 06/20/2023 10:29 AM      Tremor  On propranolol 10 mg 3 times a day but tremors are worse sometimes she has to hold her other hand because of the shaking.  He would like to have a higher dose.    Hyperlipidemia  The patient is following a low fat diet and has not exercising regularly.  She is not on meds at this time.  Patient is not having associated symptoms of chest pain, rapid heart rate, irregular heart rate, and dizziness    Patient labs are YOUR LAST LIPID PROFILE:   Lab Results   Component Value Date    CHOL 176 06/20/2023    CHOL 205 (H) 06/21/2022    CHOL 207 (H) 11/23/2021     Lab Results   Component Value Date    TRIG 91 06/20/2023    TRIG 93 06/21/2022    TRIG 94 11/23/2021     Lab Results   Component Value Date    HDL 65 (H) 06/20/2023    HDL 75 (H) 06/21/2022    HDL 76 11/23/2021     Lab Results   Component Value Date    LDLCALC 92.8 06/20/2023    LDLCALC 111.4 (H) 06/21/2022    LDLCALC 114 (H) 11/23/2021     Lab Results   Component Value Date    VLDL 17 11/23/2021    VLDL 19 05/18/2021    VLDL 13 11/17/2020     Lab Results   Component Value Date    CHOLHDLRATIO 2.7 06/20/2023    CHOLHDLRATIO 2.7 06/21/2022                Interim History: None     Review of Systems   Constitutional:  Negative for fatigue.   HENT:  Negative for congestion.    Respiratory:

## 2024-03-22 ENCOUNTER — OFFICE VISIT (OUTPATIENT)
Dept: INTERNAL MEDICINE CLINIC | Facility: CLINIC | Age: 79
End: 2024-03-22

## 2024-03-22 VITALS
HEIGHT: 68 IN | TEMPERATURE: 97.1 F | RESPIRATION RATE: 20 BRPM | OXYGEN SATURATION: 83 % | SYSTOLIC BLOOD PRESSURE: 122 MMHG | DIASTOLIC BLOOD PRESSURE: 78 MMHG | WEIGHT: 130 LBS | BODY MASS INDEX: 19.7 KG/M2 | HEART RATE: 62 BPM

## 2024-03-22 DIAGNOSIS — E55.9 VITAMIN D DEFICIENCY: ICD-10-CM

## 2024-03-22 DIAGNOSIS — I10 ESSENTIAL HYPERTENSION: ICD-10-CM

## 2024-03-22 DIAGNOSIS — J43.9 PULMONARY EMPHYSEMA, UNSPECIFIED EMPHYSEMA TYPE (HCC): Primary | ICD-10-CM

## 2024-03-22 DIAGNOSIS — J44.1 COPD EXACERBATION (HCC): ICD-10-CM

## 2024-03-22 DIAGNOSIS — M81.0 AGE-RELATED OSTEOPOROSIS WITHOUT CURRENT PATHOLOGICAL FRACTURE: ICD-10-CM

## 2024-03-22 DIAGNOSIS — G25.0 BENIGN ESSENTIAL TREMOR: ICD-10-CM

## 2024-03-22 RX ORDER — ERGOCALCIFEROL 1.25 MG/1
50000 CAPSULE ORAL WEEKLY
Qty: 12 CAPSULE | Refills: 3 | Status: SHIPPED | OUTPATIENT
Start: 2024-03-22

## 2024-03-22 RX ORDER — LISINOPRIL 20 MG/1
20 TABLET ORAL DAILY
Qty: 90 TABLET | Refills: 3 | Status: SHIPPED | OUTPATIENT
Start: 2024-03-22

## 2024-03-22 RX ORDER — PROPRANOLOL HYDROCHLORIDE 20 MG/1
10 TABLET ORAL 3 TIMES DAILY
Qty: 270 TABLET | Refills: 1 | Status: SHIPPED | OUTPATIENT
Start: 2024-03-22

## 2024-03-22 RX ORDER — ALENDRONATE SODIUM 70 MG/1
70 TABLET ORAL
Qty: 12 TABLET | Refills: 4 | Status: SHIPPED | OUTPATIENT
Start: 2024-03-22

## 2024-03-22 RX ORDER — ALBUTEROL SULFATE 90 UG/1
2 AEROSOL, METERED RESPIRATORY (INHALATION) EVERY 6 HOURS PRN
Qty: 18 G | Refills: 3 | Status: SHIPPED | OUTPATIENT
Start: 2024-03-22

## 2024-03-22 RX ORDER — FLUTICASONE PROPIONATE AND SALMETEROL 250; 50 UG/1; UG/1
1 POWDER RESPIRATORY (INHALATION) DAILY
Qty: 90 EACH | Refills: 1 | Status: SHIPPED | OUTPATIENT
Start: 2024-03-22

## 2024-03-22 ASSESSMENT — PATIENT HEALTH QUESTIONNAIRE - PHQ9
SUM OF ALL RESPONSES TO PHQ QUESTIONS 1-9: 0
1. LITTLE INTEREST OR PLEASURE IN DOING THINGS: NOT AT ALL
2. FEELING DOWN, DEPRESSED OR HOPELESS: NOT AT ALL
SUM OF ALL RESPONSES TO PHQ9 QUESTIONS 1 & 2: 0
SUM OF ALL RESPONSES TO PHQ QUESTIONS 1-9: 0

## 2024-03-22 ASSESSMENT — ENCOUNTER SYMPTOMS
BLOOD IN STOOL: 0
ABDOMINAL PAIN: 0
SHORTNESS OF BREATH: 0

## 2024-04-16 DIAGNOSIS — J43.9 PULMONARY EMPHYSEMA, UNSPECIFIED EMPHYSEMA TYPE (HCC): ICD-10-CM

## 2024-04-16 NOTE — TELEPHONE ENCOUNTER
Rx refill: albuterol sulfate HFA      Send to: [facility]/pharmacy #[id number] - [place], [place] - [address]

## 2024-04-17 RX ORDER — ALBUTEROL SULFATE 90 UG/1
2 AEROSOL, METERED RESPIRATORY (INHALATION) EVERY 6 HOURS PRN
Qty: 18 G | Refills: 3 | Status: SHIPPED | OUTPATIENT
Start: 2024-04-17

## 2024-05-31 ENCOUNTER — TELEPHONE (OUTPATIENT)
Dept: INTERNAL MEDICINE CLINIC | Facility: CLINIC | Age: 79
End: 2024-05-31

## 2024-05-31 NOTE — TELEPHONE ENCOUNTER
[employer] prescriber orders form faxed 5/30/24. Confirmation received 5/30/24. To be uploaded to chart.

## 2024-06-11 ENCOUNTER — TELEPHONE (OUTPATIENT)
Dept: INTERNAL MEDICINE CLINIC | Facility: CLINIC | Age: 79
End: 2024-06-11

## 2024-06-14 NOTE — TELEPHONE ENCOUNTER
Advised patient Dr. Alexander is working diligently to respond to all medication requests at this time and that we are waiting for the provider to sign off.   Patient state she does not like it taking 4 days for her to get a refill.     Future appt 6/27/@120pm

## 2024-06-14 NOTE — TELEPHONE ENCOUNTER
Returned call to pt, patient was informed to please contact pharmacy as she should still have a refill available     Spoke to Zoe @ [facility] rx was last filled on: 3/27/24   for a quantity of 135

## 2024-07-18 ENCOUNTER — TELEPHONE (OUTPATIENT)
Dept: INTERNAL MEDICINE CLINIC | Facility: CLINIC | Age: 79
End: 2024-07-18

## 2024-07-18 DIAGNOSIS — J44.1 COPD EXACERBATION (HCC): ICD-10-CM

## 2024-07-18 DIAGNOSIS — J43.9 PULMONARY EMPHYSEMA, UNSPECIFIED EMPHYSEMA TYPE (HCC): ICD-10-CM

## 2024-07-18 NOTE — TELEPHONE ENCOUNTER
This is a transfer patient for Dr. Walker.  She has an appointment scheduled for 9/4/24 but needs the following refills prior to that appointment:    Fluticasone-umeclidin-vilant (Trelegy Ellipta) 100-62.5-25 mcg/act aepb inhaler  ot would like to have a prescription for 3 at a time as it is cheaper than getting 3 separate.    Fluticasone-salmeterol (Advair diskus) 250-50 mcg/act aepb diskus inhaler    Please use the [facility] pharmacy on file.    Thank you

## 2024-07-22 ENCOUNTER — TELEPHONE (OUTPATIENT)
Dept: INTERNAL MEDICINE CLINIC | Facility: CLINIC | Age: 79
End: 2024-07-22

## 2024-07-22 NOTE — TELEPHONE ENCOUNTER
The pt called wanting to speak with Nakia.  She did not want to leave a msg. She stated she would call back this afternoon.      I spoke with the receptionists at the .  They stated Nakia was a float pool employee that was in the office last week to help.      I reviewed the pts chart and found that the pt had been speaking to Nakia about her Trelegy and Advair prescriptions.  I noticed that Dr. Walker sent these Rxs to the pharmacy on 3/22/24 for a 90 day supply with a refill, which should last her until 9/18/24.    So, I called [facility] ([phone]) and spoke with Demian to confirm they had them.  He stated the Advair (Wixela) was ready to be picked up, and he would get the Trelegy Rx ready for the pt as well.     I tried to contact the pt, but I got a VM, that never allowed me to LM.  I will try the pt again later.

## 2024-07-25 RX ORDER — FLUTICASONE PROPIONATE AND SALMETEROL 250; 50 UG/1; UG/1
1 POWDER RESPIRATORY (INHALATION) DAILY
Qty: 90 EACH | Refills: 1 | OUTPATIENT
Start: 2024-07-25

## 2024-08-21 ENCOUNTER — TELEPHONE (OUTPATIENT)
Dept: INTERNAL MEDICINE CLINIC | Facility: CLINIC | Age: 79
End: 2024-08-21

## 2024-08-21 DIAGNOSIS — G25.0 BENIGN ESSENTIAL TREMOR: ICD-10-CM

## 2024-08-26 NOTE — TELEPHONE ENCOUNTER
Lab Results   Component Value Date    CREATININE 1.7 (H) 05/09/2020     Sr Cr above baseline. Baseline GFR is normal  Trail of IVF's  Retroperitoneal sonogram in AM  CT scan: The bladder is distended without a catheter in place and there is bilateral moderate to severe hydronephrosis.   Consult Urology  Cont to monitor with daily labs   Avoid nephrotoxins.   Renally dose all medications    Serum bicarb level 18. Monitor for need to initiate sodium bicarb if continues to decrease.     05/08/2020:  Agree with retroperitoneal sonogram  Will hold on urology consult for now  Continue hydration at current level.  Begin empiric antibiotics for urinary tract infection and possibly infected sacral decubitus.  Piperacillin 3.375 g IV q.8 hours  Vancomycin dosed by pharmacy    5/9/2020  Retroperitoneal ultrasound with thickened bladder wall (chronic), bladder perforation (likely chronic) vs diverticulum  sCr improving   Continue IV hydration, IV antibiotics  Urology consulted for gross hematuria though this is possibly from spence insertion   Pt stated that she no longer has anymore of the following propranolol 20 mg

## 2024-08-30 RX ORDER — PROPRANOLOL HCL 20 MG
10 TABLET ORAL 3 TIMES DAILY
Qty: 135 TABLET | Refills: 4 | Status: SHIPPED | OUTPATIENT
Start: 2024-08-30

## 2024-10-17 DIAGNOSIS — J43.9 PULMONARY EMPHYSEMA, UNSPECIFIED EMPHYSEMA TYPE (HCC): ICD-10-CM

## 2024-10-17 NOTE — TELEPHONE ENCOUNTER
Refill request on:  albuterol sulfate HFA (VENTOLIN HFA) 108 (90 Base) MCG/ACT inhaler - Inhale 2 puffs into the lungs every 6 hours as needed for Wheezing     LOV - 3/22/24 w/    Next appt - 10/31/24 w/      RX pended   Thank you!

## 2024-10-21 RX ORDER — ALBUTEROL SULFATE 90 UG/1
2 INHALANT RESPIRATORY (INHALATION) EVERY 6 HOURS PRN
Qty: 54 G | Refills: 0 | Status: SHIPPED | OUTPATIENT
Start: 2024-10-21

## 2024-11-22 DIAGNOSIS — J43.9 PULMONARY EMPHYSEMA, UNSPECIFIED EMPHYSEMA TYPE (HCC): ICD-10-CM

## 2024-11-25 RX ORDER — ALBUTEROL SULFATE 90 UG/1
2 AEROSOL, METERED RESPIRATORY (INHALATION) EVERY 6 HOURS PRN
Qty: 54 EACH | OUTPATIENT
Start: 2024-11-25

## 2025-01-07 ENCOUNTER — TELEPHONE (OUTPATIENT)
Dept: FAMILY MEDICINE CLINIC | Facility: CLINIC | Age: 80
End: 2025-01-07

## 2025-01-07 NOTE — TELEPHONE ENCOUNTER
Needs refill of the inhalers and wixela 250-50 inhub     Pt says she needs these immediately    Last ov with dr almendarez was 3/22/24  Multiple appts to [facility] with dr BAHENA were scheduled & all cancelled.  Next ov with dr BAHENA 2/13/25 to [facility]

## 2025-01-08 NOTE — TELEPHONE ENCOUNTER
Returned call to pt, no answer. LVM to pls call us back.   As per  no refills will be done with out a visit, we can change her appt for a sooner day - 1/17/25 @ 1:40 if ok with pt. If appt is not re-abimael sooner,  refills will be address during her visit on 2/17/25.

## 2025-03-03 ENCOUNTER — OFFICE VISIT (OUTPATIENT)
Dept: INTERNAL MEDICINE CLINIC | Facility: CLINIC | Age: 80
End: 2025-03-03
Payer: MEDICARE

## 2025-03-03 ENCOUNTER — LAB (OUTPATIENT)
Dept: INTERNAL MEDICINE CLINIC | Facility: CLINIC | Age: 80
End: 2025-03-03

## 2025-03-03 DIAGNOSIS — Z76.89 ENCOUNTER TO ESTABLISH CARE WITH NEW DOCTOR: ICD-10-CM

## 2025-03-03 DIAGNOSIS — E55.9 VITAMIN D DEFICIENCY: ICD-10-CM

## 2025-03-03 DIAGNOSIS — Z78.0 POST-MENOPAUSAL: ICD-10-CM

## 2025-03-03 DIAGNOSIS — E78.00 PURE HYPERCHOLESTEROLEMIA: ICD-10-CM

## 2025-03-03 DIAGNOSIS — J44.9 COPD MIXED TYPE (HCC): ICD-10-CM

## 2025-03-03 DIAGNOSIS — M81.0 AGE-RELATED OSTEOPOROSIS WITHOUT CURRENT PATHOLOGICAL FRACTURE: ICD-10-CM

## 2025-03-03 DIAGNOSIS — R06.02 SHORTNESS OF BREATH: ICD-10-CM

## 2025-03-03 DIAGNOSIS — I10 ESSENTIAL HYPERTENSION: ICD-10-CM

## 2025-03-03 DIAGNOSIS — R73.01 IMPAIRED FASTING GLUCOSE: ICD-10-CM

## 2025-03-03 DIAGNOSIS — F17.210 TOBACCO DEPENDENCE DUE TO CIGARETTES: ICD-10-CM

## 2025-03-03 DIAGNOSIS — I10 ESSENTIAL HYPERTENSION: Primary | ICD-10-CM

## 2025-03-03 PROCEDURE — 1126F AMNT PAIN NOTED NONE PRSNT: CPT | Performed by: FAMILY MEDICINE

## 2025-03-03 PROCEDURE — 3080F DIAST BP >= 90 MM HG: CPT | Performed by: FAMILY MEDICINE

## 2025-03-03 PROCEDURE — 3077F SYST BP >= 140 MM HG: CPT | Performed by: FAMILY MEDICINE

## 2025-03-03 PROCEDURE — 99214 OFFICE O/P EST MOD 30 MIN: CPT | Performed by: FAMILY MEDICINE

## 2025-03-03 PROCEDURE — G8427 DOCREV CUR MEDS BY ELIG CLIN: HCPCS | Performed by: FAMILY MEDICINE

## 2025-03-03 PROCEDURE — G8420 CALC BMI NORM PARAMETERS: HCPCS | Performed by: FAMILY MEDICINE

## 2025-03-03 PROCEDURE — 3023F SPIROM DOC REV: CPT | Performed by: FAMILY MEDICINE

## 2025-03-03 PROCEDURE — G2211 COMPLEX E/M VISIT ADD ON: HCPCS | Performed by: FAMILY MEDICINE

## 2025-03-03 PROCEDURE — 1090F PRES/ABSN URINE INCON ASSESS: CPT | Performed by: FAMILY MEDICINE

## 2025-03-03 PROCEDURE — 4004F PT TOBACCO SCREEN RCVD TLK: CPT | Performed by: FAMILY MEDICINE

## 2025-03-03 PROCEDURE — 1123F ACP DISCUSS/DSCN MKR DOCD: CPT | Performed by: FAMILY MEDICINE

## 2025-03-03 PROCEDURE — G8399 PT W/DXA RESULTS DOCUMENT: HCPCS | Performed by: FAMILY MEDICINE

## 2025-03-03 PROCEDURE — 1159F MED LIST DOCD IN RCRD: CPT | Performed by: FAMILY MEDICINE

## 2025-03-03 RX ORDER — FLUTICASONE PROPIONATE AND SALMETEROL 250; 50 UG/1; UG/1
1 POWDER RESPIRATORY (INHALATION) DAILY
COMMUNITY
End: 2025-03-06 | Stop reason: SDUPTHER

## 2025-03-03 RX ORDER — BLOOD PRESSURE TEST KIT
1 KIT MISCELLANEOUS DAILY
Qty: 1 KIT | Refills: 0 | Status: SHIPPED | OUTPATIENT
Start: 2025-03-03

## 2025-03-03 RX ORDER — FLUTICASONE FUROATE, UMECLIDINIUM BROMIDE AND VILANTEROL TRIFENATATE 100; 62.5; 25 UG/1; UG/1; UG/1
POWDER RESPIRATORY (INHALATION)
COMMUNITY
Start: 2024-12-10 | End: 2025-03-06 | Stop reason: SDUPTHER

## 2025-03-03 RX ORDER — FLUTICASONE FUROATE, UMECLIDINIUM BROMIDE AND VILANTEROL TRIFENATATE 100; 62.5; 25 UG/1; UG/1; UG/1
POWDER RESPIRATORY (INHALATION)
Status: CANCELLED | OUTPATIENT
Start: 2025-03-03

## 2025-03-03 RX ORDER — LISINOPRIL 20 MG/1
20 TABLET ORAL DAILY
Qty: 90 TABLET | Refills: 1 | Status: CANCELLED | OUTPATIENT
Start: 2025-03-03

## 2025-03-03 RX ORDER — ALBUTEROL SULFATE 90 UG/1
2 INHALANT RESPIRATORY (INHALATION) EVERY 6 HOURS PRN
Qty: 54 G | Refills: 0 | Status: CANCELLED | OUTPATIENT
Start: 2025-03-03

## 2025-03-03 RX ORDER — ALENDRONATE SODIUM 70 MG/1
70 TABLET ORAL
Qty: 12 TABLET | Refills: 4 | Status: CANCELLED | OUTPATIENT
Start: 2025-03-03

## 2025-03-03 RX ORDER — FLUTICASONE PROPIONATE AND SALMETEROL 250; 50 UG/1; UG/1
1 POWDER RESPIRATORY (INHALATION) DAILY
Qty: 60 EACH | Status: CANCELLED | OUTPATIENT
Start: 2025-03-03

## 2025-03-03 SDOH — ECONOMIC STABILITY: FOOD INSECURITY: WITHIN THE PAST 12 MONTHS, THE FOOD YOU BOUGHT JUST DIDN'T LAST AND YOU DIDN'T HAVE MONEY TO GET MORE.: NEVER TRUE

## 2025-03-03 SDOH — ECONOMIC STABILITY: FOOD INSECURITY: WITHIN THE PAST 12 MONTHS, YOU WORRIED THAT YOUR FOOD WOULD RUN OUT BEFORE YOU GOT MONEY TO BUY MORE.: NEVER TRUE

## 2025-03-03 ASSESSMENT — PATIENT HEALTH QUESTIONNAIRE - PHQ9
SUM OF ALL RESPONSES TO PHQ QUESTIONS 1-9: 0
2. FEELING DOWN, DEPRESSED OR HOPELESS: NOT AT ALL
SUM OF ALL RESPONSES TO PHQ QUESTIONS 1-9: 0
1. LITTLE INTEREST OR PLEASURE IN DOING THINGS: NOT AT ALL

## 2025-03-03 NOTE — PROGRESS NOTES
about a year or so, no previous work up, long term hypertension along with smoking history. States that inhalers help but still has SOB intermittently, especially with exertion. No chest pain or palpitations associated with it. She has always taken trelegy in the morning and wixela at night, and albuterol as needed, requesting refill of it.  ROS negative except as noted above today.    Social History     Tobacco Use    Smoking status: Every Day     Current packs/day: 0.50     Average packs/day: 0.5 packs/day for 60.2 years (30.1 ttl pk-yrs)     Types: Cigarettes     Start date: 1965    Smokeless tobacco: Never   Vaping Use    Vaping status: Never Used   Substance Use Topics    Alcohol use: No    Drug use: No     Vitals:    03/03/25 1032 03/03/25 1042 03/03/25 1120   BP: (!) 200/120 (!) 194/110 (!) 176/98   BP Site: Left Upper Arm Right Upper Arm    Patient Position: Sitting Sitting    BP Cuff Size: Medium Adult Medium Adult    Pulse: 80     Temp: 97.2 °F (36.2 °C)     TempSrc: Temporal     SpO2: 92%     Weight: 59.9 kg (132 lb)     Height: 1.707 m (5' 7.21\")        Body mass index is 20.55 kg/m².  Physical Exam  Vitals reviewed.   Constitutional:       General: She is not in acute distress.     Appearance: Normal appearance. She is normal weight. She is not ill-appearing or toxic-appearing.   HENT:      Head: Normocephalic and atraumatic.   Cardiovascular:      Rate and Rhythm: Normal rate and regular rhythm.      Pulses: Normal pulses.      Heart sounds: Normal heart sounds.   Pulmonary:      Effort: Pulmonary effort is normal.      Breath sounds: Normal breath sounds.   Musculoskeletal:      Cervical back: Neck supple.      Right lower leg: No edema.      Left lower leg: No edema.   Skin:     General: Skin is warm and dry.   Neurological:      General: No focal deficit present.      Mental Status: She is alert and oriented to person, place, and time. Mental status is at baseline.   Psychiatric:         Mood and

## 2025-03-04 ENCOUNTER — TELEPHONE (OUTPATIENT)
Dept: INTERNAL MEDICINE CLINIC | Facility: CLINIC | Age: 80
End: 2025-03-04

## 2025-03-04 DIAGNOSIS — E78.00 PURE HYPERCHOLESTEROLEMIA: ICD-10-CM

## 2025-03-04 DIAGNOSIS — E55.9 VITAMIN D DEFICIENCY: Primary | ICD-10-CM

## 2025-03-04 DIAGNOSIS — I10 ESSENTIAL HYPERTENSION: ICD-10-CM

## 2025-03-04 DIAGNOSIS — R73.01 IMPAIRED FASTING GLUCOSE: ICD-10-CM

## 2025-03-04 NOTE — TELEPHONE ENCOUNTER
----- Message from Radha CUENCA sent at 3/3/2025  3:39 PM EST -----  Regarding: Lab Reorder  The following lab test(s) were not completed.  Lab tests:  lipid panel, TSH, CMP, CBC, Vitamin D, hemoglobin A1c  Recollect reason: unable to obtain blood, patient left before having labs collected.    Please consult with the ordering physician/APC if the tests are needed.    If this test requires recollection, please re-enter order in Epic within 24 hours of this notice and respond to this message as Client Services will contact the patient.     If this test DOES NOT require recollection, document this in Epic documentation only encounter.    If you need additional information, respond to this message and/or call [phone].    [facility] Client Services

## 2025-03-05 ENCOUNTER — TELEPHONE (OUTPATIENT)
Dept: INTERNAL MEDICINE CLINIC | Facility: CLINIC | Age: 80
End: 2025-03-05

## 2025-03-05 NOTE — TELEPHONE ENCOUNTER
Patient called stating she desperately need the following medications.    TRELEGY ELLIPTA 100-62.5-25 MCG/ACT AEPB inhaler     fluticasone-salmeterol (WIXELA INHUB) 250-50 MCG/ACT AEPB diskus inhaler     albuterol sulfate HFA (VENTOLIN HFA) 108 (90 Base) MCG/ACT inhaler     Was last seen 3/3/2025    [facility]/pharmacy #[id number] - [place], [place] - [address]

## 2025-03-06 RX ORDER — LISINOPRIL 20 MG/1
20 TABLET ORAL DAILY
Qty: 90 TABLET | Refills: 3 | Status: SHIPPED | OUTPATIENT
Start: 2025-03-06

## 2025-03-06 RX ORDER — ALBUTEROL SULFATE 90 UG/1
2 INHALANT RESPIRATORY (INHALATION) EVERY 6 HOURS PRN
Qty: 54 G | Refills: 5 | Status: SHIPPED | OUTPATIENT
Start: 2025-03-06

## 2025-03-06 RX ORDER — FLUTICASONE FUROATE, UMECLIDINIUM BROMIDE AND VILANTEROL TRIFENATATE 100; 62.5; 25 UG/1; UG/1; UG/1
1 POWDER RESPIRATORY (INHALATION) DAILY
Qty: 60 EACH | Refills: 5 | Status: SHIPPED | OUTPATIENT
Start: 2025-03-06

## 2025-03-06 RX ORDER — FLUTICASONE PROPIONATE AND SALMETEROL 250; 50 UG/1; UG/1
1 POWDER RESPIRATORY (INHALATION) DAILY
Qty: 60 EACH | Refills: 5 | Status: SHIPPED | OUTPATIENT
Start: 2025-03-06

## 2025-03-06 NOTE — TELEPHONE ENCOUNTER
Patient called regarding checking the status of this refill -    Patient stated that she only has one dose left of her trelegy and is out of the rest of her prescriptions.

## 2025-03-10 VITALS
OXYGEN SATURATION: 92 % | BODY MASS INDEX: 20.72 KG/M2 | SYSTOLIC BLOOD PRESSURE: 176 MMHG | HEART RATE: 80 BPM | WEIGHT: 132 LBS | DIASTOLIC BLOOD PRESSURE: 98 MMHG | HEIGHT: 67 IN | TEMPERATURE: 97.2 F

## 2025-03-10 PROBLEM — R06.02 SHORTNESS OF BREATH: Status: ACTIVE | Noted: 2025-03-10

## 2025-03-10 PROBLEM — F17.210 TOBACCO DEPENDENCE DUE TO CIGARETTES: Status: ACTIVE | Noted: 2025-03-10

## 2025-03-14 DIAGNOSIS — J44.9 COPD MIXED TYPE (HCC): ICD-10-CM

## 2025-03-14 RX ORDER — FLUTICASONE FUROATE, UMECLIDINIUM BROMIDE AND VILANTEROL TRIFENATATE 100; 62.5; 25 UG/1; UG/1; UG/1
POWDER RESPIRATORY (INHALATION)
Qty: 180 EACH | Refills: 1 | OUTPATIENT
Start: 2025-03-14

## 2025-05-19 ENCOUNTER — TELEPHONE (OUTPATIENT)
Dept: FAMILY MEDICINE CLINIC | Facility: CLINIC | Age: 80
End: 2025-05-19

## 2025-05-19 NOTE — TELEPHONE ENCOUNTER
LOV 3/3/25  Next appt 5/27/25      Needs refill of propranolol 20 MG  Pt has been taking full 20 mg at a time instead of half of a 20 mg 3x daily. Pt requests change in dosage    Needs this sent to [facility]/pharmacy #[id number] - [place], [place] - [address] -  [phone] -  [phone]

## 2025-05-21 NOTE — TELEPHONE ENCOUNTER
Called pt, no answer. LVM to call us back.     Medication adjustment will be address at her next appt on 5/27/25

## 2025-05-21 NOTE — TELEPHONE ENCOUNTER
As per pharmacy, last RX for propanolol filled on  3/12/25 - for 135 tablets for a 90 day supply   #270 tablets still available

## 2025-05-27 ENCOUNTER — OFFICE VISIT (OUTPATIENT)
Dept: INTERNAL MEDICINE CLINIC | Facility: CLINIC | Age: 80
End: 2025-05-27

## 2025-05-27 VITALS
TEMPERATURE: 97.2 F | WEIGHT: 131 LBS | DIASTOLIC BLOOD PRESSURE: 94 MMHG | SYSTOLIC BLOOD PRESSURE: 171 MMHG | HEART RATE: 78 BPM | OXYGEN SATURATION: 90 % | BODY MASS INDEX: 20.56 KG/M2 | HEIGHT: 67 IN

## 2025-05-27 DIAGNOSIS — J06.9 ACUTE URI: ICD-10-CM

## 2025-05-27 DIAGNOSIS — J44.9 COPD MIXED TYPE (HCC): ICD-10-CM

## 2025-05-27 DIAGNOSIS — R09.81 NASAL CONGESTION: ICD-10-CM

## 2025-05-27 DIAGNOSIS — I10 ESSENTIAL HYPERTENSION: Primary | ICD-10-CM

## 2025-05-27 DIAGNOSIS — G25.0 BENIGN ESSENTIAL TREMOR: ICD-10-CM

## 2025-05-27 RX ORDER — AZITHROMYCIN 250 MG/1
TABLET, FILM COATED ORAL
Qty: 6 TABLET | Refills: 0 | Status: ON HOLD | OUTPATIENT
Start: 2025-05-27 | End: 2025-06-06

## 2025-05-27 RX ORDER — AZITHROMYCIN 250 MG/1
TABLET, FILM COATED ORAL
Qty: 6 TABLET | Refills: 0 | Status: SHIPPED | OUTPATIENT
Start: 2025-05-27 | End: 2025-05-27

## 2025-05-27 RX ORDER — PROPRANOLOL HCL 20 MG
20 TABLET ORAL 3 TIMES DAILY
Qty: 270 TABLET | Refills: 2 | Status: ON HOLD | OUTPATIENT
Start: 2025-05-27

## 2025-05-27 RX ORDER — PROPRANOLOL HCL 20 MG
20 TABLET ORAL 3 TIMES DAILY
Qty: 270 TABLET | Refills: 1 | Status: SHIPPED | OUTPATIENT
Start: 2025-05-27 | End: 2025-05-27

## 2025-05-27 ASSESSMENT — PATIENT HEALTH QUESTIONNAIRE - PHQ9
2. FEELING DOWN, DEPRESSED OR HOPELESS: NOT AT ALL
SUM OF ALL RESPONSES TO PHQ QUESTIONS 1-9: 0
1. LITTLE INTEREST OR PLEASURE IN DOING THINGS: NOT AT ALL
SUM OF ALL RESPONSES TO PHQ QUESTIONS 1-9: 0

## 2025-05-27 NOTE — PROGRESS NOTES
Darya Alexander DO  [facility] [place] [address]  Phone [phone]  Fax [phone]      Noemy Mckinney (: 1945) is a 80 y.o. female, here for evaluation of the following chief complaint(s):  Follow-up and Medication Refill (Propanolol )       ASSESSMENT/PLAN:  1. Essential hypertension  Overview:  Tolerating medication well for HTN. Achieving desired therapeutic response with   Hypertension Medications       ACE Inhibitors       lisinopril (PRINIVIL;ZESTRIL) tablet 40 mg 40 mg, Oral, DAILY       Beta Blockers Non-Selective       propranolol (INDERAL) tablet 20 mg 20 mg, Oral, 3 TIMES DAILY         --will continue. Will periodically review and adjust if needed.  Encourage home monitoring.    --Warning si/sx reviewed with patient, advised to go to the ER if any occurs  2. Benign essential tremor  Overview:  On propranolol  Stable on med, continue it  Increased the dose  Refilled  Orders:  -     propranolol (INDERAL) 20 MG tablet; Take 1 tablet by mouth 3 times daily, Disp-270 tablet, R-2Normal  3. Nasal congestion  -     azithromycin (ZITHROMAX) 250 MG tablet; 500mg on day 1 followed by 250mg on days 2 - 5, Disp-6 tablet, R-0Normal  4. Acute URI  -     azithromycin (ZITHROMAX) 250 MG tablet; 500mg on day 1 followed by 250mg on days 2 - 5, Disp-6 tablet, R-0Normal  5. COPD mixed type (HCC)  Overview:  On inhaler  Reviewed side effects, interactions, and safety precautions  Monitor for now  Orders:  -     azithromycin (ZITHROMAX) 250 MG tablet; 500mg on day 1 followed by 250mg on days 2 - 5, Disp-6 tablet, R-0Normal    Use medication as prescribed, reviewed side effects, safety precautions, and interactions. Nature and course of illness reviewed. Supportive care including OTC meds, Tylenol as needed fever, increase in fluids, steam inhalation, and rest reviewed. Call office/RTC if any s/s worsen or do not improve.  Warning signs and symptoms reviewed

## 2025-05-30 ENCOUNTER — HOSPITAL ENCOUNTER (INPATIENT)
Age: 80
LOS: 4 days | Discharge: HOME HEALTH | DRG: 190 | End: 2025-06-03
Attending: STUDENT IN AN ORGANIZED HEALTH CARE EDUCATION/TRAINING PROGRAM | Admitting: FAMILY MEDICINE
Payer: MEDICARE

## 2025-05-30 ENCOUNTER — APPOINTMENT (OUTPATIENT)
Dept: GENERAL RADIOLOGY | Age: 80
DRG: 190 | End: 2025-05-30
Payer: MEDICARE

## 2025-05-30 DIAGNOSIS — J44.1 COPD EXACERBATION (HCC): Primary | ICD-10-CM

## 2025-05-30 DIAGNOSIS — R09.02 HYPOXIA: ICD-10-CM

## 2025-05-30 DIAGNOSIS — R79.89 ELEVATED BRAIN NATRIURETIC PEPTIDE (BNP) LEVEL: ICD-10-CM

## 2025-05-30 LAB
ALBUMIN SERPL-MCNC: 3.4 G/DL (ref 3.2–4.6)
ALBUMIN/GLOB SERPL: 1 (ref 1–1.9)
ALP SERPL-CCNC: 127 U/L (ref 35–104)
ALT SERPL-CCNC: 16 U/L (ref 8–45)
ANION GAP SERPL CALC-SCNC: 14 MMOL/L (ref 7–16)
AST SERPL-CCNC: 31 U/L (ref 15–37)
BASOPHILS # BLD: 0.04 K/UL (ref 0–0.2)
BASOPHILS NFR BLD: 0.3 % (ref 0–2)
BILIRUB SERPL-MCNC: 0.5 MG/DL (ref 0–1.2)
BUN SERPL-MCNC: 19 MG/DL (ref 8–23)
CALCIUM SERPL-MCNC: 9.3 MG/DL (ref 8.8–10.2)
CHLORIDE SERPL-SCNC: 103 MMOL/L (ref 98–107)
CO2 SERPL-SCNC: 23 MMOL/L (ref 20–29)
CREAT SERPL-MCNC: 0.79 MG/DL (ref 0.6–1.1)
DIFFERENTIAL METHOD BLD: ABNORMAL
EOSINOPHIL # BLD: 0.04 K/UL (ref 0–0.8)
EOSINOPHIL NFR BLD: 0.3 % (ref 0.5–7.8)
ERYTHROCYTE [DISTWIDTH] IN BLOOD BY AUTOMATED COUNT: 14.6 % (ref 11.9–14.6)
GLOBULIN SER CALC-MCNC: 3.4 G/DL (ref 2.3–3.5)
GLUCOSE SERPL-MCNC: 111 MG/DL (ref 70–99)
HCT VFR BLD AUTO: 45.8 % (ref 35.8–46.3)
HGB BLD-MCNC: 15.1 G/DL (ref 11.7–15.4)
IMM GRANULOCYTES # BLD AUTO: 0.05 K/UL (ref 0–0.5)
IMM GRANULOCYTES NFR BLD AUTO: 0.4 % (ref 0–5)
LYMPHOCYTES # BLD: 1.82 K/UL (ref 0.5–4.6)
LYMPHOCYTES NFR BLD: 15.7 % (ref 13–44)
MCH RBC QN AUTO: 28.2 PG (ref 26.1–32.9)
MCHC RBC AUTO-ENTMCNC: 33 G/DL (ref 31.4–35)
MCV RBC AUTO: 85.4 FL (ref 82–102)
MONOCYTES # BLD: 1.03 K/UL (ref 0.1–1.3)
MONOCYTES NFR BLD: 8.9 % (ref 4–12)
NEUTS SEG # BLD: 8.6 K/UL (ref 1.7–8.2)
NEUTS SEG NFR BLD: 74.4 % (ref 43–78)
NRBC # BLD: 0 K/UL (ref 0–0.2)
NT PRO BNP: 1560 PG/ML (ref 0–450)
PLATELET # BLD AUTO: 224 K/UL (ref 150–450)
PMV BLD AUTO: 8.9 FL (ref 9.4–12.3)
POTASSIUM SERPL-SCNC: 4.5 MMOL/L (ref 3.5–5.1)
PROT SERPL-MCNC: 6.8 G/DL (ref 6.3–8.2)
RBC # BLD AUTO: 5.36 M/UL (ref 4.05–5.2)
SODIUM SERPL-SCNC: 140 MMOL/L (ref 136–145)
TROPONIN T SERPL HS-MCNC: 12.3 NG/L (ref 0–14)
TROPONIN T SERPL HS-MCNC: 13.5 NG/L (ref 0–14)
WBC # BLD AUTO: 11.6 K/UL (ref 4.3–11.1)

## 2025-05-30 PROCEDURE — 96375 TX/PRO/DX INJ NEW DRUG ADDON: CPT

## 2025-05-30 PROCEDURE — 94760 N-INVAS EAR/PLS OXIMETRY 1: CPT

## 2025-05-30 PROCEDURE — 71045 X-RAY EXAM CHEST 1 VIEW: CPT

## 2025-05-30 PROCEDURE — 94761 N-INVAS EAR/PLS OXIMETRY MLT: CPT

## 2025-05-30 PROCEDURE — 84484 ASSAY OF TROPONIN QUANT: CPT

## 2025-05-30 PROCEDURE — 6360000002 HC RX W HCPCS: Performed by: STUDENT IN AN ORGANIZED HEALTH CARE EDUCATION/TRAINING PROGRAM

## 2025-05-30 PROCEDURE — 93005 ELECTROCARDIOGRAM TRACING: CPT | Performed by: STUDENT IN AN ORGANIZED HEALTH CARE EDUCATION/TRAINING PROGRAM

## 2025-05-30 PROCEDURE — 99285 EMERGENCY DEPT VISIT HI MDM: CPT

## 2025-05-30 PROCEDURE — 85025 COMPLETE CBC W/AUTO DIFF WBC: CPT

## 2025-05-30 PROCEDURE — 2500000003 HC RX 250 WO HCPCS: Performed by: STUDENT IN AN ORGANIZED HEALTH CARE EDUCATION/TRAINING PROGRAM

## 2025-05-30 PROCEDURE — 6370000000 HC RX 637 (ALT 250 FOR IP): Performed by: STUDENT IN AN ORGANIZED HEALTH CARE EDUCATION/TRAINING PROGRAM

## 2025-05-30 PROCEDURE — 96365 THER/PROPH/DIAG IV INF INIT: CPT

## 2025-05-30 PROCEDURE — 83880 ASSAY OF NATRIURETIC PEPTIDE: CPT

## 2025-05-30 PROCEDURE — 2700000000 HC OXYGEN THERAPY PER DAY

## 2025-05-30 PROCEDURE — 94640 AIRWAY INHALATION TREATMENT: CPT

## 2025-05-30 PROCEDURE — 94644 CONT INHLJ TX 1ST HOUR: CPT

## 2025-05-30 PROCEDURE — 1100000000 HC RM PRIVATE

## 2025-05-30 PROCEDURE — 80053 COMPREHEN METABOLIC PANEL: CPT

## 2025-05-30 RX ORDER — IPRATROPIUM BROMIDE AND ALBUTEROL SULFATE 2.5; .5 MG/3ML; MG/3ML
1 SOLUTION RESPIRATORY (INHALATION)
Status: COMPLETED | OUTPATIENT
Start: 2025-05-30 | End: 2025-05-30

## 2025-05-30 RX ORDER — ALBUTEROL SULFATE 0.83 MG/ML
10 SOLUTION RESPIRATORY (INHALATION)
Status: COMPLETED | OUTPATIENT
Start: 2025-05-30 | End: 2025-05-30

## 2025-05-30 RX ORDER — HYDRALAZINE HYDROCHLORIDE 20 MG/ML
10 INJECTION INTRAMUSCULAR; INTRAVENOUS ONCE
Status: COMPLETED | OUTPATIENT
Start: 2025-05-30 | End: 2025-05-30

## 2025-05-30 RX ORDER — MAGNESIUM SULFATE IN WATER 40 MG/ML
2000 INJECTION, SOLUTION INTRAVENOUS ONCE
Status: COMPLETED | OUTPATIENT
Start: 2025-05-30 | End: 2025-05-30

## 2025-05-30 RX ADMIN — METHYLPREDNISOLONE SODIUM SUCCINATE 125 MG: 125 INJECTION INTRAMUSCULAR; INTRAVENOUS at 22:27

## 2025-05-30 RX ADMIN — MAGNESIUM SULFATE HEPTAHYDRATE 2000 MG: 40 INJECTION, SOLUTION INTRAVENOUS at 21:31

## 2025-05-30 RX ADMIN — ALBUTEROL SULFATE 10 MG: 2.5 SOLUTION RESPIRATORY (INHALATION) at 22:33

## 2025-05-30 RX ADMIN — IPRATROPIUM BROMIDE AND ALBUTEROL SULFATE 1 DOSE: 2.5; .5 SOLUTION RESPIRATORY (INHALATION) at 21:18

## 2025-05-30 RX ADMIN — HYDRALAZINE HYDROCHLORIDE 10 MG: 20 INJECTION, SOLUTION INTRAMUSCULAR; INTRAVENOUS at 23:18

## 2025-05-30 ASSESSMENT — PAIN SCALES - GENERAL: PAINLEVEL_OUTOF10: 0

## 2025-05-30 ASSESSMENT — LIFESTYLE VARIABLES
HOW MANY STANDARD DRINKS CONTAINING ALCOHOL DO YOU HAVE ON A TYPICAL DAY: PATIENT DOES NOT DRINK
HOW OFTEN DO YOU HAVE A DRINK CONTAINING ALCOHOL: NEVER

## 2025-05-30 ASSESSMENT — PAIN - FUNCTIONAL ASSESSMENT: PAIN_FUNCTIONAL_ASSESSMENT: 0-10

## 2025-05-31 ENCOUNTER — APPOINTMENT (OUTPATIENT)
Dept: NON INVASIVE DIAGNOSTICS | Age: 80
DRG: 190 | End: 2025-05-31
Attending: FAMILY MEDICINE
Payer: MEDICARE

## 2025-05-31 PROBLEM — R79.89 ELEVATED BRAIN NATRIURETIC PEPTIDE (BNP) LEVEL: Status: ACTIVE | Noted: 2025-05-31

## 2025-05-31 PROBLEM — G25.0 BENIGN ESSENTIAL TREMOR: Status: ACTIVE | Noted: 2025-05-31

## 2025-05-31 PROBLEM — I10 MALIGNANT HYPERTENSION: Status: ACTIVE | Noted: 2025-05-31

## 2025-05-31 LAB
ANION GAP SERPL CALC-SCNC: 10 MMOL/L (ref 7–16)
B PERT DNA SPEC QL NAA+PROBE: NOT DETECTED
BASOPHILS # BLD: 0.02 K/UL (ref 0–0.2)
BASOPHILS NFR BLD: 0.2 % (ref 0–2)
BORDETELLA PARAPERTUSSIS BY PCR: NOT DETECTED
BUN SERPL-MCNC: 22 MG/DL (ref 8–23)
C PNEUM DNA SPEC QL NAA+PROBE: NOT DETECTED
CALCIUM SERPL-MCNC: 9.1 MG/DL (ref 8.8–10.2)
CHLORIDE SERPL-SCNC: 105 MMOL/L (ref 98–107)
CO2 SERPL-SCNC: 24 MMOL/L (ref 20–29)
CREAT SERPL-MCNC: 0.8 MG/DL (ref 0.6–1.1)
DIFFERENTIAL METHOD BLD: ABNORMAL
ECHO AV AREA PEAK VELOCITY: 3.7 CM2
ECHO AV AREA VTI: 3.7 CM2
ECHO AV AREA/BSA PEAK VELOCITY: 2.2 CM2/M2
ECHO AV AREA/BSA VTI: 2.2 CM2/M2
ECHO AV MEAN GRADIENT: 3 MMHG
ECHO AV MEAN VELOCITY: 0.9 M/S
ECHO AV PEAK GRADIENT: 7 MMHG
ECHO AV PEAK VELOCITY: 1.3 M/S
ECHO AV VELOCITY RATIO: 0.85
ECHO AV VTI: 25.9 CM
ECHO BSA: 1.68 M2
ECHO EST RA PRESSURE: 8 MMHG
ECHO IVC EXP: 2.8 CM
ECHO LA AREA 2C: 27.3 CM2
ECHO LA AREA 4C: 27.1 CM2
ECHO LA DIAMETER INDEX: 2.13 CM/M2
ECHO LA DIAMETER: 3.6 CM
ECHO LA MAJOR AXIS: 6.8 CM
ECHO LA MINOR AXIS: 6.9 CM
ECHO LA VOL BP: 88 ML (ref 22–52)
ECHO LA VOL MOD A2C: 89 ML (ref 22–52)
ECHO LA VOL MOD A4C: 86 ML (ref 22–52)
ECHO LA VOL/BSA BIPLANE: 52 ML/M2 (ref 16–34)
ECHO LA VOLUME INDEX MOD A2C: 53 ML/M2 (ref 16–34)
ECHO LA VOLUME INDEX MOD A4C: 51 ML/M2 (ref 16–34)
ECHO LV E' LATERAL VELOCITY: 6.29 CM/S
ECHO LV E' SEPTAL VELOCITY: 5.13 CM/S
ECHO LV EDV A2C: 85 ML
ECHO LV EDV A4C: 77 ML
ECHO LV EDV INDEX A4C: 46 ML/M2
ECHO LV EDV NDEX A2C: 50 ML/M2
ECHO LV EF PHYSICIAN: 55 %
ECHO LV EJECTION FRACTION A2C: 58 %
ECHO LV EJECTION FRACTION BIPLANE: 56 % (ref 55–100)
ECHO LV ESV A2C: 35 ML
ECHO LV ESV A4C: 36 ML
ECHO LV ESV INDEX A2C: 21 ML/M2
ECHO LV ESV INDEX A4C: 21 ML/M2
ECHO LV FRACTIONAL SHORTENING: 33 % (ref 28–44)
ECHO LV INTERNAL DIMENSION DIASTOLE INDEX: 2.31 CM/M2
ECHO LV INTERNAL DIMENSION DIASTOLIC: 3.9 CM (ref 3.9–5.3)
ECHO LV INTERNAL DIMENSION SYSTOLIC INDEX: 1.54 CM/M2
ECHO LV INTERNAL DIMENSION SYSTOLIC: 2.6 CM
ECHO LV IVSD: 1.1 CM (ref 0.6–0.9)
ECHO LV MASS 2D: 159.3 G (ref 67–162)
ECHO LV MASS INDEX 2D: 94.3 G/M2 (ref 43–95)
ECHO LV POSTERIOR WALL DIASTOLIC: 1.3 CM (ref 0.6–0.9)
ECHO LV RELATIVE WALL THICKNESS RATIO: 0.67
ECHO LVOT AREA: 4.5 CM2
ECHO LVOT AV VTI INDEX: 0.81
ECHO LVOT DIAM: 2.4 CM
ECHO LVOT MEAN GRADIENT: 2 MMHG
ECHO LVOT PEAK GRADIENT: 5 MMHG
ECHO LVOT PEAK VELOCITY: 1.1 M/S
ECHO LVOT STROKE VOLUME INDEX: 56.5 ML/M2
ECHO LVOT SV: 95.4 ML
ECHO LVOT VTI: 21.1 CM
ECHO MV A VELOCITY: 0.83 M/S
ECHO MV AREA VTI: 3 CM2
ECHO MV E DECELERATION TIME (DT): 252 MS
ECHO MV E VELOCITY: 0.98 M/S
ECHO MV E/A RATIO: 1.18
ECHO MV E/E' LATERAL: 15.58
ECHO MV E/E' RATIO (AVERAGED): 17.34
ECHO MV E/E' SEPTAL: 19.1
ECHO MV LVOT VTI INDEX: 1.48
ECHO MV MAX VELOCITY: 1 M/S
ECHO MV MEAN GRADIENT: 2 MMHG
ECHO MV MEAN VELOCITY: 0.7 M/S
ECHO MV PEAK GRADIENT: 4 MMHG
ECHO MV VTI: 31.3 CM
ECHO PV ACCELERATION TIME (AT): 114 MS
ECHO PV MAX VELOCITY: 0.7 M/S
ECHO PV PEAK GRADIENT: 2 MMHG
ECHO RIGHT VENTRICULAR SYSTOLIC PRESSURE (RVSP): 26 MMHG
ECHO RV BASAL DIMENSION: 4.1 CM
ECHO RV FREE WALL PEAK S': 10.8 CM/S
ECHO RV TAPSE: 2.2 CM (ref 1.7–?)
ECHO TV REGURGITANT MAX VELOCITY: 2.14 M/S
ECHO TV REGURGITANT PEAK GRADIENT: 18 MMHG
EKG ATRIAL RATE: 80 BPM
EKG DIAGNOSIS: NORMAL
EKG P AXIS: 68 DEGREES
EKG P-R INTERVAL: 152 MS
EKG Q-T INTERVAL: 396 MS
EKG QRS DURATION: 98 MS
EKG QTC CALCULATION (BAZETT): 454 MS
EKG R AXIS: 53 DEGREES
EKG T AXIS: 41 DEGREES
EKG VENTRICULAR RATE: 79 BPM
EOSINOPHIL # BLD: 0.02 K/UL (ref 0–0.8)
EOSINOPHIL NFR BLD: 0.2 % (ref 0.5–7.8)
ERYTHROCYTE [DISTWIDTH] IN BLOOD BY AUTOMATED COUNT: 14.4 % (ref 11.9–14.6)
FLUAV SUBTYP SPEC NAA+PROBE: NOT DETECTED
FLUBV RNA SPEC QL NAA+PROBE: NOT DETECTED
GLUCOSE SERPL-MCNC: 184 MG/DL (ref 70–99)
HADV DNA SPEC QL NAA+PROBE: NOT DETECTED
HCOV 229E RNA SPEC QL NAA+PROBE: NOT DETECTED
HCOV HKU1 RNA SPEC QL NAA+PROBE: NOT DETECTED
HCOV NL63 RNA SPEC QL NAA+PROBE: NOT DETECTED
HCOV OC43 RNA SPEC QL NAA+PROBE: NOT DETECTED
HCT VFR BLD AUTO: 45.5 % (ref 35.8–46.3)
HGB BLD-MCNC: 15.3 G/DL (ref 11.7–15.4)
HMPV RNA SPEC QL NAA+PROBE: NOT DETECTED
HPIV1 RNA SPEC QL NAA+PROBE: NOT DETECTED
HPIV2 RNA SPEC QL NAA+PROBE: NOT DETECTED
HPIV3 RNA SPEC QL NAA+PROBE: NOT DETECTED
HPIV4 RNA SPEC QL NAA+PROBE: NOT DETECTED
IMM GRANULOCYTES # BLD AUTO: 0.04 K/UL (ref 0–0.5)
IMM GRANULOCYTES NFR BLD AUTO: 0.4 % (ref 0–5)
LYMPHOCYTES # BLD: 1.02 K/UL (ref 0.5–4.6)
LYMPHOCYTES NFR BLD: 10.9 % (ref 13–44)
M PNEUMO DNA SPEC QL NAA+PROBE: NOT DETECTED
MCH RBC QN AUTO: 28.7 PG (ref 26.1–32.9)
MCHC RBC AUTO-ENTMCNC: 33.6 G/DL (ref 31.4–35)
MCV RBC AUTO: 85.2 FL (ref 82–102)
MONOCYTES # BLD: 0.1 K/UL (ref 0.1–1.3)
MONOCYTES NFR BLD: 1.1 % (ref 4–12)
NEUTS SEG # BLD: 8.2 K/UL (ref 1.7–8.2)
NEUTS SEG NFR BLD: 87.2 % (ref 43–78)
NRBC # BLD: 0 K/UL (ref 0–0.2)
PLATELET # BLD AUTO: 241 K/UL (ref 150–450)
PMV BLD AUTO: 8.8 FL (ref 9.4–12.3)
POTASSIUM SERPL-SCNC: 4.2 MMOL/L (ref 3.5–5.1)
PROCALCITONIN SERPL-MCNC: 0.05 NG/ML (ref 0–0.1)
RBC # BLD AUTO: 5.34 M/UL (ref 4.05–5.2)
RSV RNA SPEC QL NAA+PROBE: NOT DETECTED
RV+EV RNA SPEC QL NAA+PROBE: DETECTED
SARS-COV-2 RNA RESP QL NAA+PROBE: NOT DETECTED
SODIUM SERPL-SCNC: 139 MMOL/L (ref 136–145)
WBC # BLD AUTO: 9.4 K/UL (ref 4.3–11.1)

## 2025-05-31 PROCEDURE — 97165 OT EVAL LOW COMPLEX 30 MIN: CPT

## 2025-05-31 PROCEDURE — 84145 PROCALCITONIN (PCT): CPT

## 2025-05-31 PROCEDURE — 0202U NFCT DS 22 TRGT SARS-COV-2: CPT

## 2025-05-31 PROCEDURE — 80048 BASIC METABOLIC PNL TOTAL CA: CPT

## 2025-05-31 PROCEDURE — 6360000002 HC RX W HCPCS: Performed by: FAMILY MEDICINE

## 2025-05-31 PROCEDURE — 6370000000 HC RX 637 (ALT 250 FOR IP): Performed by: FAMILY MEDICINE

## 2025-05-31 PROCEDURE — 36415 COLL VENOUS BLD VENIPUNCTURE: CPT

## 2025-05-31 PROCEDURE — 97530 THERAPEUTIC ACTIVITIES: CPT

## 2025-05-31 PROCEDURE — 93010 ELECTROCARDIOGRAM REPORT: CPT | Performed by: INTERNAL MEDICINE

## 2025-05-31 PROCEDURE — 97535 SELF CARE MNGMENT TRAINING: CPT

## 2025-05-31 PROCEDURE — 2700000000 HC OXYGEN THERAPY PER DAY

## 2025-05-31 PROCEDURE — 94640 AIRWAY INHALATION TREATMENT: CPT

## 2025-05-31 PROCEDURE — 94761 N-INVAS EAR/PLS OXIMETRY MLT: CPT

## 2025-05-31 PROCEDURE — 1100000000 HC RM PRIVATE

## 2025-05-31 PROCEDURE — 2500000003 HC RX 250 WO HCPCS: Performed by: FAMILY MEDICINE

## 2025-05-31 PROCEDURE — 93306 TTE W/DOPPLER COMPLETE: CPT

## 2025-05-31 PROCEDURE — 85025 COMPLETE CBC W/AUTO DIFF WBC: CPT

## 2025-05-31 PROCEDURE — 93306 TTE W/DOPPLER COMPLETE: CPT | Performed by: INTERNAL MEDICINE

## 2025-05-31 PROCEDURE — 97161 PT EVAL LOW COMPLEX 20 MIN: CPT

## 2025-05-31 RX ORDER — ARFORMOTEROL TARTRATE 15 UG/2ML
15 SOLUTION RESPIRATORY (INHALATION)
Status: DISCONTINUED | OUTPATIENT
Start: 2025-05-31 | End: 2025-05-31

## 2025-05-31 RX ORDER — SODIUM CHLORIDE 0.9 % (FLUSH) 0.9 %
5-40 SYRINGE (ML) INJECTION EVERY 12 HOURS SCHEDULED
Status: DISCONTINUED | OUTPATIENT
Start: 2025-05-31 | End: 2025-06-03 | Stop reason: HOSPADM

## 2025-05-31 RX ORDER — BUDESONIDE 0.5 MG/2ML
0.5 INHALANT ORAL
Status: DISCONTINUED | OUTPATIENT
Start: 2025-05-31 | End: 2025-06-03 | Stop reason: HOSPADM

## 2025-05-31 RX ORDER — ONDANSETRON 4 MG/1
4 TABLET, ORALLY DISINTEGRATING ORAL EVERY 8 HOURS PRN
Status: DISCONTINUED | OUTPATIENT
Start: 2025-05-31 | End: 2025-06-03 | Stop reason: HOSPADM

## 2025-05-31 RX ORDER — GUAIFENESIN 600 MG/1
600 TABLET, EXTENDED RELEASE ORAL 2 TIMES DAILY
Status: DISCONTINUED | OUTPATIENT
Start: 2025-05-31 | End: 2025-06-03 | Stop reason: HOSPADM

## 2025-05-31 RX ORDER — POTASSIUM CHLORIDE 1500 MG/1
40 TABLET, EXTENDED RELEASE ORAL PRN
Status: DISCONTINUED | OUTPATIENT
Start: 2025-05-31 | End: 2025-06-03 | Stop reason: HOSPADM

## 2025-05-31 RX ORDER — POTASSIUM CHLORIDE 7.45 MG/ML
10 INJECTION INTRAVENOUS PRN
Status: DISCONTINUED | OUTPATIENT
Start: 2025-05-31 | End: 2025-06-03 | Stop reason: HOSPADM

## 2025-05-31 RX ORDER — SODIUM CHLORIDE 9 MG/ML
INJECTION, SOLUTION INTRAVENOUS PRN
Status: DISCONTINUED | OUTPATIENT
Start: 2025-05-31 | End: 2025-06-03 | Stop reason: HOSPADM

## 2025-05-31 RX ORDER — AZITHROMYCIN 250 MG/1
250 TABLET, FILM COATED ORAL DAILY
Status: COMPLETED | OUTPATIENT
Start: 2025-05-31 | End: 2025-06-03

## 2025-05-31 RX ORDER — MAGNESIUM SULFATE IN WATER 40 MG/ML
2000 INJECTION, SOLUTION INTRAVENOUS PRN
Status: DISCONTINUED | OUTPATIENT
Start: 2025-05-31 | End: 2025-06-03 | Stop reason: HOSPADM

## 2025-05-31 RX ORDER — ENOXAPARIN SODIUM 100 MG/ML
40 INJECTION SUBCUTANEOUS DAILY
Status: DISCONTINUED | OUTPATIENT
Start: 2025-05-31 | End: 2025-06-03 | Stop reason: HOSPADM

## 2025-05-31 RX ORDER — ACETAMINOPHEN 325 MG/1
650 TABLET ORAL EVERY 6 HOURS PRN
Status: DISCONTINUED | OUTPATIENT
Start: 2025-05-31 | End: 2025-06-03 | Stop reason: HOSPADM

## 2025-05-31 RX ORDER — ARFORMOTEROL TARTRATE 15 UG/2ML
15 SOLUTION RESPIRATORY (INHALATION)
Status: DISCONTINUED | OUTPATIENT
Start: 2025-05-31 | End: 2025-06-03 | Stop reason: HOSPADM

## 2025-05-31 RX ORDER — ONDANSETRON 2 MG/ML
4 INJECTION INTRAMUSCULAR; INTRAVENOUS EVERY 6 HOURS PRN
Status: DISCONTINUED | OUTPATIENT
Start: 2025-05-31 | End: 2025-06-03 | Stop reason: HOSPADM

## 2025-05-31 RX ORDER — PROPRANOLOL HYDROCHLORIDE 40 MG/1
20 TABLET ORAL 3 TIMES DAILY
Status: DISCONTINUED | OUTPATIENT
Start: 2025-05-31 | End: 2025-06-03 | Stop reason: HOSPADM

## 2025-05-31 RX ORDER — GUAIFENESIN/DEXTROMETHORPHAN 100-10MG/5
5 SYRUP ORAL EVERY 4 HOURS PRN
Status: DISCONTINUED | OUTPATIENT
Start: 2025-05-31 | End: 2025-06-03 | Stop reason: HOSPADM

## 2025-05-31 RX ORDER — LISINOPRIL 20 MG/1
40 TABLET ORAL DAILY
Status: DISCONTINUED | OUTPATIENT
Start: 2025-05-31 | End: 2025-06-03 | Stop reason: HOSPADM

## 2025-05-31 RX ORDER — IPRATROPIUM BROMIDE AND ALBUTEROL SULFATE 2.5; .5 MG/3ML; MG/3ML
1 SOLUTION RESPIRATORY (INHALATION)
Status: DISCONTINUED | OUTPATIENT
Start: 2025-05-31 | End: 2025-06-03 | Stop reason: HOSPADM

## 2025-05-31 RX ORDER — ACETAMINOPHEN 650 MG/1
650 SUPPOSITORY RECTAL EVERY 6 HOURS PRN
Status: DISCONTINUED | OUTPATIENT
Start: 2025-05-31 | End: 2025-06-03 | Stop reason: HOSPADM

## 2025-05-31 RX ORDER — SODIUM CHLORIDE 0.9 % (FLUSH) 0.9 %
5-40 SYRINGE (ML) INJECTION PRN
Status: DISCONTINUED | OUTPATIENT
Start: 2025-05-31 | End: 2025-06-03 | Stop reason: HOSPADM

## 2025-05-31 RX ORDER — ALBUTEROL SULFATE 0.83 MG/ML
2.5 SOLUTION RESPIRATORY (INHALATION) EVERY 4 HOURS PRN
Status: DISCONTINUED | OUTPATIENT
Start: 2025-05-31 | End: 2025-06-03 | Stop reason: HOSPADM

## 2025-05-31 RX ORDER — LISINOPRIL 20 MG/1
20 TABLET ORAL DAILY
Status: DISCONTINUED | OUTPATIENT
Start: 2025-05-31 | End: 2025-05-31

## 2025-05-31 RX ORDER — POLYETHYLENE GLYCOL 3350 17 G/17G
17 POWDER, FOR SOLUTION ORAL DAILY PRN
Status: DISCONTINUED | OUTPATIENT
Start: 2025-05-31 | End: 2025-06-03 | Stop reason: HOSPADM

## 2025-05-31 RX ORDER — BUDESONIDE 0.5 MG/2ML
0.5 INHALANT ORAL
Status: DISCONTINUED | OUTPATIENT
Start: 2025-05-31 | End: 2025-05-31

## 2025-05-31 RX ADMIN — GUAIFENESIN 600 MG: 600 TABLET, MULTILAYER, EXTENDED RELEASE ORAL at 09:53

## 2025-05-31 RX ADMIN — IPRATROPIUM BROMIDE AND ALBUTEROL SULFATE 1 DOSE: 2.5; .5 SOLUTION RESPIRATORY (INHALATION) at 20:33

## 2025-05-31 RX ADMIN — ENOXAPARIN SODIUM 40 MG: 100 INJECTION SUBCUTANEOUS at 09:53

## 2025-05-31 RX ADMIN — METHYLPREDNISOLONE SODIUM SUCCINATE 40 MG: 40 INJECTION INTRAMUSCULAR; INTRAVENOUS at 17:56

## 2025-05-31 RX ADMIN — GUAIFENESIN 600 MG: 600 TABLET, MULTILAYER, EXTENDED RELEASE ORAL at 21:02

## 2025-05-31 RX ADMIN — GUAIFENESIN AND DEXTROMETHORPHAN 5 ML: 100; 10 SYRUP ORAL at 10:21

## 2025-05-31 RX ADMIN — SODIUM CHLORIDE, PRESERVATIVE FREE 10 ML: 5 INJECTION INTRAVENOUS at 09:59

## 2025-05-31 RX ADMIN — ARFORMOTEROL TARTRATE 15 MCG: 15 SOLUTION RESPIRATORY (INHALATION) at 20:33

## 2025-05-31 RX ADMIN — AZITHROMYCIN DIHYDRATE 250 MG: 250 TABLET ORAL at 09:52

## 2025-05-31 RX ADMIN — PROPRANOLOL HYDROCHLORIDE 20 MG: 40 TABLET ORAL at 21:02

## 2025-05-31 RX ADMIN — SODIUM CHLORIDE, PRESERVATIVE FREE 10 ML: 5 INJECTION INTRAVENOUS at 21:03

## 2025-05-31 RX ADMIN — IPRATROPIUM BROMIDE AND ALBUTEROL SULFATE 1 DOSE: 2.5; .5 SOLUTION RESPIRATORY (INHALATION) at 15:54

## 2025-05-31 RX ADMIN — LISINOPRIL 40 MG: 20 TABLET ORAL at 09:53

## 2025-05-31 RX ADMIN — IPRATROPIUM BROMIDE AND ALBUTEROL SULFATE 1 DOSE: 2.5; .5 SOLUTION RESPIRATORY (INHALATION) at 07:49

## 2025-05-31 RX ADMIN — METHYLPREDNISOLONE SODIUM SUCCINATE 40 MG: 40 INJECTION INTRAMUSCULAR; INTRAVENOUS at 09:59

## 2025-05-31 RX ADMIN — PROPRANOLOL HYDROCHLORIDE 20 MG: 40 TABLET ORAL at 09:58

## 2025-05-31 RX ADMIN — PROPRANOLOL HYDROCHLORIDE 20 MG: 40 TABLET ORAL at 13:45

## 2025-05-31 RX ADMIN — BUDESONIDE 500 MCG: 0.5 INHALANT RESPIRATORY (INHALATION) at 20:33

## 2025-05-31 RX ADMIN — METHYLPREDNISOLONE SODIUM SUCCINATE 40 MG: 40 INJECTION INTRAMUSCULAR; INTRAVENOUS at 23:40

## 2025-05-31 NOTE — ED NOTES
TRANSFER - OUT REPORT:    Verbal report given to KRISTEN Rush on Noemy Mckinney  being transferred to [facility] for routine progression of patient care       Report consisted of patient's Situation, Background, Assessment and   Recommendations(SBAR).     Information from the following report(s) Index, ED SBAR, MAR, and Recent Results was reviewed with the receiving nurse.    Lines:   Peripheral IV 05/30/25 Left Antecubital (Active)   Site Assessment Clean, dry & intact 05/30/25 2116   Line Status Blood return noted;Brisk blood return;Specimen collected;Flushed;Normal saline locked 05/30/25 2116   Phlebitis Assessment No symptoms 05/30/25 2116   Infiltration Assessment 0 05/30/25 2116   Dressing Status Clean, dry & intact 05/30/25 2116   Dressing Intervention New 05/30/25 2116        Opportunity for questions and clarification was provided.      Patient transported with:  O2 @ 3lpm and Registered Nurse

## 2025-05-31 NOTE — H&P
Hospitalist History and Physical   Admit Date:  2025  8:17 PM   Name:  Noemy Mckinney   Age:  80 y.o.  Sex:  female  :  1945   MRN:  [id number]     Presenting Complaint: shortness of breath  Reason(s) for Admission: Hypoxia [R09.02]  COPD exacerbation (HCC) [J44.1]  Elevated brain natriuretic peptide (BNP) level [R79.89]     History of Present Illness:   Noemy Mckinney is a 80 y.o. female with medical history of COPD, HTN who presented to ED with shortness of breath.  Symptoms ongoing for the past few days.  Associated poor PO intake as well.  Upon ER evaluation, patient found to be hypoxic to 84% on RA.  Currently requiring 4L/m supplemental O2.  pBNP to 1560.  CXR shows:  IMPRESSION:  1.  No acute cardiopulmonary process.  WBC is 11.6. Patient given albuterol neb and duoneb in addition to magnesium and IV solumedrol.  Hospitalist asked to admit.    Review of Systems:  10 systems reviewed and negative except as noted in HPI.  Assessment & Plan:   * COPD exacerbation (HCC)  Assessment & Plan  - Scheduled duonebs, PRN albuterol neb  - IV solumedrol  - IV azithromycin  - Wean O2 as tolerated     Elevated brain natriuretic peptide (BNP) level  Assessment & Plan  - No h/o CHF.  No significant renal dysfunction  - Patient also does not appear to be volume overloaded at all.  CXR is clear and no peripheral edema noted on exam  - Will check echo    Essential hypertension  Assessment & Plan  - Continue home meds        Disposition: inpatient      Past medical history reviewed.    Past Medical History:   Diagnosis Date    Chronic obstructive pulmonary disease (HCC)     uses inhalers; pt states has no pulmonologist    Chronic pain     lower back    Colitis  &     pt states no problems now 16    Diverticulosis of colon (without mention of hemorrhage) 2015    Fibrocystic breast     bilat breasts    Glaucoma     uses eye drops    H/O echocardiogram 3/27/14    EF 55%-60%; mild pulmonary

## 2025-05-31 NOTE — CARE COORDINATION
RNCM met with patient in room 364 to discuss discharge planning.   Patient is a 80 year old female admitted with acute shortness of breath, per MD notes.    Patient presents to assessment alert and oriented, and answers questions appropriately.  Patient typically lives at home with spouse.  At baseline, patient is independent with ADLs. Patient is community .    Demographics verified. Patient has Medicare A and B and [facility] supplemental insurance.  PCP is Dr. Alexander.    At time of assessment, patient is receiving oxygen therapy. Case management will follow for possible home oxygen therapy needs. PT/OT evaluations pending.  Case management will continue to follow.  Please notify if there are any changes.           05/31/25 1032   Service Assessment   Patient Orientation Alert and Oriented;Person;Place;Situation;Self   Cognition Alert   History Provided By Patient   Primary Caregiver Self   Accompanied By/Relationship n/a   Support Systems Spouse/Significant Other   Patient's Healthcare Decision Maker is: Legal Next of Kin  (Goldy Mckinney, spouse [phone])   PCP Verified by CM Yes   Last Visit to PCP Within last 3 months   Prior Functional Level Independent in ADLs/IADLs   Current Functional Level Independent in ADLs/IADLs   Can patient return to prior living arrangement Yes   Ability to make needs known: Good   Family able to assist with home care needs: Yes   Would you like for me to discuss the discharge plan with any other family members/significant others, and if so, who? Yes   Financial Resources Medicare;Other (Comment)  ([employer] supplement)   Community Resources None   CM/SW Referral Other (see comment)  (n/a)   Social/Functional History   Lives With Spouse   Type of Home House   Prior Level of Assist for ADLs Independent   Prior Level of Assist for Homemaking Independent   Homemaking Responsibilities Yes   Ambulation Assistance Independent   Prior Level of Assist for  electronic

## 2025-05-31 NOTE — ASSESSMENT & PLAN NOTE
- No h/o CHF.  No significant renal dysfunction  - Patient also does not appear to be volume overloaded at all.  CXR is clear and no peripheral edema noted on exam  - Will check echo

## 2025-05-31 NOTE — ED PROVIDER NOTES
file        Past History and Complexity:     Past Medical History:   Diagnosis Date    Chronic obstructive pulmonary disease (HCC)     uses inhalers; pt states has no pulmonologist    Chronic pain     lower back    Colitis 2001 & 2014    pt states no problems now 2/1/16    Diverticulosis of colon (without mention of hemorrhage) 5/11/2015    Fibrocystic breast     bilat breasts    Glaucoma     uses eye drops    H/O echocardiogram 3/27/14    EF 55%-60%; mild pulmonary artery HTN    History of ischemic colitis 2001    Hyperlipemia 5/11/2015    pt states no problems now 2/1/16    Hypertension     managed w/med    Occlusion and stenosis of carotid artery without mention of cerebral infarction 5/11/2015    Osteoarthritis     Osteoarthrosis, unspecified whether generalized or localized, hand 5/11/2015    Osteoporosis 2020    Osteoporosis, unspecified 5/11/2015    Post-operative nausea and vomiting     pt states hx of; pt states no current problems after left shoulder replacement    Proteinuria 5/11/2015    Sciatica of right side     takes lyrica    Shingles 1980    Status post total left knee replacement 2/24/2016    Syncope 1/2014    X1 episode    Tobacco use disorder 5/11/2015    1/2 ppd for 50 yrs    Vitamin D deficiency 5/11/2015    takes vitamin d supplement        Past Surgical History:   Procedure Laterality Date    APPENDECTOMY      BREAST BIOPSY Right     X2 benign biopsies    COLONOSCOPY  2014    HYSTERECTOMY (CERVIX STATUS UNKNOWN)  1976    due to endometriosis    SHOULDER ARTHROPLASTY Left 7/30/2014    TOTAL KNEE ARTHROPLASTY Left 2/24/2016        Social History     Socioeconomic History    Marital status:      Spouse name: None    Number of children: None    Years of education: None    Highest education level: None   Tobacco Use    Smoking status: Every Day     Current packs/day: 0.50     Average packs/day: 0.5 packs/day for 60.4 years (30.2 ttl pk-yrs)     Types: Cigarettes     Start date: 1965

## 2025-05-31 NOTE — ED TRIAGE NOTES
Patient presents with c/o shortness of breath worst with activity, Spo2 while in triage 84%, immediately commenced O2 via NC at 5l and patient was roomed     Hx COPD and asthma, does not require O2 at home     On last dose of Zithromax for possible chest infection    Per  patient has not been eating or drinking for the past \"couple days\" and patient advises of some diarrhea, denies nausea/vomiting

## 2025-05-31 NOTE — ACP (ADVANCE CARE PLANNING)
Advance Care Planning   General Advance Care Planning (ACP) Conversation    Date of Conversation: 5/30/2025  Conducted with: Patient with Decision Making Capacity  Other persons present: None    Healthcare Decision Maker:    Primary Decision Maker: Goldy Mckinney - [facility] - [phone]    Today we documented Decision Maker(s) consistent with Legal Next of Kin hierarchy.      Length of Voluntary ACP Conversation in minutes:  <16 minutes (Non-Billable)    Gabriela Henderson RN

## 2025-05-31 NOTE — ASSESSMENT & PLAN NOTE
- Scheduled duonebs, PRN albuterol neb  - IV solumedrol  - IV azithromycin  - Wean O2 as tolerated

## 2025-06-01 PROBLEM — B34.8 RHINOVIRUS INFECTION: Status: ACTIVE | Noted: 2025-06-01

## 2025-06-01 LAB
ANION GAP SERPL CALC-SCNC: 11 MMOL/L (ref 7–16)
BASOPHILS # BLD: 0.02 K/UL (ref 0–0.2)
BASOPHILS NFR BLD: 0.1 % (ref 0–2)
BUN SERPL-MCNC: 34 MG/DL (ref 8–23)
CALCIUM SERPL-MCNC: 9.2 MG/DL (ref 8.8–10.2)
CHLORIDE SERPL-SCNC: 106 MMOL/L (ref 98–107)
CO2 SERPL-SCNC: 26 MMOL/L (ref 20–29)
CREAT SERPL-MCNC: 0.94 MG/DL (ref 0.6–1.1)
DIFFERENTIAL METHOD BLD: ABNORMAL
EOSINOPHIL # BLD: 0 K/UL (ref 0–0.8)
EOSINOPHIL NFR BLD: 0 % (ref 0.5–7.8)
ERYTHROCYTE [DISTWIDTH] IN BLOOD BY AUTOMATED COUNT: 14.6 % (ref 11.9–14.6)
GLUCOSE SERPL-MCNC: 143 MG/DL (ref 70–99)
HCT VFR BLD AUTO: 45.1 % (ref 35.8–46.3)
HGB BLD-MCNC: 14.5 G/DL (ref 11.7–15.4)
IMM GRANULOCYTES # BLD AUTO: 0.14 K/UL (ref 0–0.5)
IMM GRANULOCYTES NFR BLD AUTO: 0.8 % (ref 0–5)
LYMPHOCYTES # BLD: 1.32 K/UL (ref 0.5–4.6)
LYMPHOCYTES NFR BLD: 7.1 % (ref 13–44)
MCH RBC QN AUTO: 27.8 PG (ref 26.1–32.9)
MCHC RBC AUTO-ENTMCNC: 32.2 G/DL (ref 31.4–35)
MCV RBC AUTO: 86.4 FL (ref 82–102)
MONOCYTES # BLD: 0.31 K/UL (ref 0.1–1.3)
MONOCYTES NFR BLD: 1.7 % (ref 4–12)
NEUTS SEG # BLD: 16.86 K/UL (ref 1.7–8.2)
NEUTS SEG NFR BLD: 90.3 % (ref 43–78)
NRBC # BLD: 0 K/UL (ref 0–0.2)
PLATELET # BLD AUTO: 254 K/UL (ref 150–450)
PMV BLD AUTO: 8.8 FL (ref 9.4–12.3)
POTASSIUM SERPL-SCNC: 4.4 MMOL/L (ref 3.5–5.1)
RBC # BLD AUTO: 5.22 M/UL (ref 4.05–5.2)
SODIUM SERPL-SCNC: 143 MMOL/L (ref 136–145)
WBC # BLD AUTO: 18.7 K/UL (ref 4.3–11.1)

## 2025-06-01 PROCEDURE — 85025 COMPLETE CBC W/AUTO DIFF WBC: CPT

## 2025-06-01 PROCEDURE — 97530 THERAPEUTIC ACTIVITIES: CPT

## 2025-06-01 PROCEDURE — 36415 COLL VENOUS BLD VENIPUNCTURE: CPT

## 2025-06-01 PROCEDURE — 2700000000 HC OXYGEN THERAPY PER DAY

## 2025-06-01 PROCEDURE — 94761 N-INVAS EAR/PLS OXIMETRY MLT: CPT

## 2025-06-01 PROCEDURE — 94640 AIRWAY INHALATION TREATMENT: CPT

## 2025-06-01 PROCEDURE — 2500000003 HC RX 250 WO HCPCS: Performed by: FAMILY MEDICINE

## 2025-06-01 PROCEDURE — 6370000000 HC RX 637 (ALT 250 FOR IP): Performed by: FAMILY MEDICINE

## 2025-06-01 PROCEDURE — 6360000002 HC RX W HCPCS: Performed by: FAMILY MEDICINE

## 2025-06-01 PROCEDURE — 80048 BASIC METABOLIC PNL TOTAL CA: CPT

## 2025-06-01 PROCEDURE — 1100000000 HC RM PRIVATE

## 2025-06-01 RX ORDER — HYDRALAZINE HYDROCHLORIDE 20 MG/ML
5 INJECTION INTRAMUSCULAR; INTRAVENOUS EVERY 6 HOURS PRN
Status: DISCONTINUED | OUTPATIENT
Start: 2025-06-01 | End: 2025-06-03

## 2025-06-01 RX ORDER — AMLODIPINE BESYLATE 5 MG/1
5 TABLET ORAL DAILY
Status: DISCONTINUED | OUTPATIENT
Start: 2025-06-01 | End: 2025-06-02

## 2025-06-01 RX ORDER — HYDRALAZINE HYDROCHLORIDE 20 MG/ML
10 INJECTION INTRAMUSCULAR; INTRAVENOUS EVERY 6 HOURS PRN
Status: DISCONTINUED | OUTPATIENT
Start: 2025-06-01 | End: 2025-06-01

## 2025-06-01 RX ADMIN — METHYLPREDNISOLONE SODIUM SUCCINATE 40 MG: 40 INJECTION INTRAMUSCULAR; INTRAVENOUS at 19:48

## 2025-06-01 RX ADMIN — METHYLPREDNISOLONE SODIUM SUCCINATE 40 MG: 40 INJECTION INTRAMUSCULAR; INTRAVENOUS at 08:24

## 2025-06-01 RX ADMIN — ENOXAPARIN SODIUM 40 MG: 100 INJECTION SUBCUTANEOUS at 08:39

## 2025-06-01 RX ADMIN — PROPRANOLOL HYDROCHLORIDE 20 MG: 40 TABLET ORAL at 14:43

## 2025-06-01 RX ADMIN — BUDESONIDE 500 MCG: 0.5 INHALANT RESPIRATORY (INHALATION) at 07:29

## 2025-06-01 RX ADMIN — ARFORMOTEROL TARTRATE 15 MCG: 15 SOLUTION RESPIRATORY (INHALATION) at 07:29

## 2025-06-01 RX ADMIN — AZITHROMYCIN DIHYDRATE 250 MG: 250 TABLET ORAL at 08:24

## 2025-06-01 RX ADMIN — IPRATROPIUM BROMIDE AND ALBUTEROL SULFATE 1 DOSE: 2.5; .5 SOLUTION RESPIRATORY (INHALATION) at 11:03

## 2025-06-01 RX ADMIN — ARFORMOTEROL TARTRATE 15 MCG: 15 SOLUTION RESPIRATORY (INHALATION) at 20:47

## 2025-06-01 RX ADMIN — PROPRANOLOL HYDROCHLORIDE 20 MG: 40 TABLET ORAL at 19:48

## 2025-06-01 RX ADMIN — GUAIFENESIN 600 MG: 600 TABLET, MULTILAYER, EXTENDED RELEASE ORAL at 19:48

## 2025-06-01 RX ADMIN — BUDESONIDE 500 MCG: 0.5 INHALANT RESPIRATORY (INHALATION) at 20:47

## 2025-06-01 RX ADMIN — PROPRANOLOL HYDROCHLORIDE 20 MG: 40 TABLET ORAL at 08:24

## 2025-06-01 RX ADMIN — IPRATROPIUM BROMIDE AND ALBUTEROL SULFATE 1 DOSE: 2.5; .5 SOLUTION RESPIRATORY (INHALATION) at 20:47

## 2025-06-01 RX ADMIN — IPRATROPIUM BROMIDE AND ALBUTEROL SULFATE 1 DOSE: 2.5; .5 SOLUTION RESPIRATORY (INHALATION) at 15:35

## 2025-06-01 RX ADMIN — LISINOPRIL 40 MG: 20 TABLET ORAL at 08:23

## 2025-06-01 RX ADMIN — SODIUM CHLORIDE, PRESERVATIVE FREE 10 ML: 5 INJECTION INTRAVENOUS at 19:49

## 2025-06-01 RX ADMIN — GUAIFENESIN 600 MG: 600 TABLET, MULTILAYER, EXTENDED RELEASE ORAL at 08:24

## 2025-06-01 RX ADMIN — IPRATROPIUM BROMIDE AND ALBUTEROL SULFATE 1 DOSE: 2.5; .5 SOLUTION RESPIRATORY (INHALATION) at 07:29

## 2025-06-01 RX ADMIN — AMLODIPINE BESYLATE 5 MG: 5 TABLET ORAL at 12:01

## 2025-06-02 LAB
ANION GAP SERPL CALC-SCNC: 10 MMOL/L (ref 7–16)
BASOPHILS # BLD: 0.04 K/UL (ref 0–0.2)
BASOPHILS NFR BLD: 0.2 % (ref 0–2)
BUN SERPL-MCNC: 37 MG/DL (ref 8–23)
CALCIUM SERPL-MCNC: 8.9 MG/DL (ref 8.8–10.2)
CHLORIDE SERPL-SCNC: 106 MMOL/L (ref 98–107)
CO2 SERPL-SCNC: 25 MMOL/L (ref 20–29)
CREAT SERPL-MCNC: 0.88 MG/DL (ref 0.6–1.1)
DIFFERENTIAL METHOD BLD: ABNORMAL
EOSINOPHIL # BLD: 0.03 K/UL (ref 0–0.8)
EOSINOPHIL NFR BLD: 0.2 % (ref 0.5–7.8)
ERYTHROCYTE [DISTWIDTH] IN BLOOD BY AUTOMATED COUNT: 14.6 % (ref 11.9–14.6)
GLUCOSE SERPL-MCNC: 160 MG/DL (ref 70–99)
HCT VFR BLD AUTO: 43.9 % (ref 35.8–46.3)
HGB BLD-MCNC: 14.3 G/DL (ref 11.7–15.4)
IMM GRANULOCYTES # BLD AUTO: 0.12 K/UL (ref 0–0.5)
IMM GRANULOCYTES NFR BLD AUTO: 0.7 % (ref 0–5)
LYMPHOCYTES # BLD: 1.31 K/UL (ref 0.5–4.6)
LYMPHOCYTES NFR BLD: 7.2 % (ref 13–44)
MCH RBC QN AUTO: 28.5 PG (ref 26.1–32.9)
MCHC RBC AUTO-ENTMCNC: 32.6 G/DL (ref 31.4–35)
MCV RBC AUTO: 87.5 FL (ref 82–102)
MONOCYTES # BLD: 0.43 K/UL (ref 0.1–1.3)
MONOCYTES NFR BLD: 2.4 % (ref 4–12)
NEUTS SEG # BLD: 16.24 K/UL (ref 1.7–8.2)
NEUTS SEG NFR BLD: 89.3 % (ref 43–78)
NRBC # BLD: 0 K/UL (ref 0–0.2)
PLATELET # BLD AUTO: 272 K/UL (ref 150–450)
PMV BLD AUTO: 9.1 FL (ref 9.4–12.3)
POTASSIUM SERPL-SCNC: 4.3 MMOL/L (ref 3.5–5.1)
RBC # BLD AUTO: 5.02 M/UL (ref 4.05–5.2)
SODIUM SERPL-SCNC: 141 MMOL/L (ref 136–145)
WBC # BLD AUTO: 18.2 K/UL (ref 4.3–11.1)

## 2025-06-02 PROCEDURE — 2500000003 HC RX 250 WO HCPCS: Performed by: FAMILY MEDICINE

## 2025-06-02 PROCEDURE — 6370000000 HC RX 637 (ALT 250 FOR IP): Performed by: FAMILY MEDICINE

## 2025-06-02 PROCEDURE — 97530 THERAPEUTIC ACTIVITIES: CPT

## 2025-06-02 PROCEDURE — 6360000002 HC RX W HCPCS: Performed by: FAMILY MEDICINE

## 2025-06-02 PROCEDURE — 94760 N-INVAS EAR/PLS OXIMETRY 1: CPT

## 2025-06-02 PROCEDURE — 1100000000 HC RM PRIVATE

## 2025-06-02 PROCEDURE — 94761 N-INVAS EAR/PLS OXIMETRY MLT: CPT

## 2025-06-02 PROCEDURE — 80048 BASIC METABOLIC PNL TOTAL CA: CPT

## 2025-06-02 PROCEDURE — 94640 AIRWAY INHALATION TREATMENT: CPT

## 2025-06-02 PROCEDURE — 36415 COLL VENOUS BLD VENIPUNCTURE: CPT

## 2025-06-02 PROCEDURE — 2700000000 HC OXYGEN THERAPY PER DAY

## 2025-06-02 PROCEDURE — 85025 COMPLETE CBC W/AUTO DIFF WBC: CPT

## 2025-06-02 RX ORDER — AMLODIPINE BESYLATE 10 MG/1
10 TABLET ORAL DAILY
Status: DISCONTINUED | OUTPATIENT
Start: 2025-06-03 | End: 2025-06-03 | Stop reason: HOSPADM

## 2025-06-02 RX ADMIN — ARFORMOTEROL TARTRATE 15 MCG: 15 SOLUTION RESPIRATORY (INHALATION) at 07:34

## 2025-06-02 RX ADMIN — GUAIFENESIN 600 MG: 600 TABLET, MULTILAYER, EXTENDED RELEASE ORAL at 08:29

## 2025-06-02 RX ADMIN — IPRATROPIUM BROMIDE AND ALBUTEROL SULFATE 1 DOSE: 2.5; .5 SOLUTION RESPIRATORY (INHALATION) at 15:08

## 2025-06-02 RX ADMIN — METHYLPREDNISOLONE SODIUM SUCCINATE 40 MG: 40 INJECTION INTRAMUSCULAR; INTRAVENOUS at 21:57

## 2025-06-02 RX ADMIN — ENOXAPARIN SODIUM 40 MG: 100 INJECTION SUBCUTANEOUS at 08:29

## 2025-06-02 RX ADMIN — PROPRANOLOL HYDROCHLORIDE 20 MG: 40 TABLET ORAL at 08:29

## 2025-06-02 RX ADMIN — METHYLPREDNISOLONE SODIUM SUCCINATE 40 MG: 40 INJECTION INTRAMUSCULAR; INTRAVENOUS at 08:29

## 2025-06-02 RX ADMIN — IPRATROPIUM BROMIDE AND ALBUTEROL SULFATE 1 DOSE: 2.5; .5 SOLUTION RESPIRATORY (INHALATION) at 21:35

## 2025-06-02 RX ADMIN — LISINOPRIL 40 MG: 20 TABLET ORAL at 08:28

## 2025-06-02 RX ADMIN — IPRATROPIUM BROMIDE AND ALBUTEROL SULFATE 1 DOSE: 2.5; .5 SOLUTION RESPIRATORY (INHALATION) at 11:39

## 2025-06-02 RX ADMIN — PROPRANOLOL HYDROCHLORIDE 20 MG: 40 TABLET ORAL at 21:58

## 2025-06-02 RX ADMIN — SODIUM CHLORIDE, PRESERVATIVE FREE 10 ML: 5 INJECTION INTRAVENOUS at 08:31

## 2025-06-02 RX ADMIN — SODIUM CHLORIDE, PRESERVATIVE FREE 10 ML: 5 INJECTION INTRAVENOUS at 21:58

## 2025-06-02 RX ADMIN — ARFORMOTEROL TARTRATE 15 MCG: 15 SOLUTION RESPIRATORY (INHALATION) at 21:35

## 2025-06-02 RX ADMIN — AZITHROMYCIN DIHYDRATE 250 MG: 250 TABLET ORAL at 08:28

## 2025-06-02 RX ADMIN — PROPRANOLOL HYDROCHLORIDE 20 MG: 40 TABLET ORAL at 13:59

## 2025-06-02 RX ADMIN — BUDESONIDE 500 MCG: 0.5 INHALANT RESPIRATORY (INHALATION) at 21:35

## 2025-06-02 RX ADMIN — GUAIFENESIN 600 MG: 600 TABLET, MULTILAYER, EXTENDED RELEASE ORAL at 21:57

## 2025-06-02 RX ADMIN — IPRATROPIUM BROMIDE AND ALBUTEROL SULFATE 1 DOSE: 2.5; .5 SOLUTION RESPIRATORY (INHALATION) at 07:34

## 2025-06-02 RX ADMIN — BUDESONIDE 500 MCG: 0.5 INHALANT RESPIRATORY (INHALATION) at 07:34

## 2025-06-02 RX ADMIN — AMLODIPINE BESYLATE 5 MG: 5 TABLET ORAL at 08:28

## 2025-06-02 NOTE — PLAN OF CARE
Problem: Discharge Planning  Goal: Discharge to home or other facility with appropriate resources  6/2/2025 0903 by Saida Daniels RN  Outcome: Progressing  6/2/2025 0341 by Cecelia Draper RN  Outcome: Progressing  Flowsheets (Taken 6/1/2025 1945)  Discharge to home or other facility with appropriate resources:   Identify barriers to discharge with patient and caregiver   Arrange for needed discharge resources and transportation as appropriate   Identify discharge learning needs (meds, wound care, etc)   Refer to discharge planning if patient needs post-hospital services based on physician order or complex needs related to functional status, cognitive ability or social support system     Problem: Pain  Goal: Verbalizes/displays adequate comfort level or baseline comfort level  6/2/2025 0903 by Saida Daniels RN  Outcome: Progressing  6/2/2025 0341 by Cecelia Draper RN  Outcome: Progressing  Flowsheets (Taken 6/1/2025 1916)  Verbalizes/displays adequate comfort level or baseline comfort level:   Encourage patient to monitor pain and request assistance   Assess pain using appropriate pain scale   Administer analgesics based on type and severity of pain and evaluate response     Problem: Safety - Adult  Goal: Free from fall injury  6/2/2025 0903 by Saida Daniels RN  Outcome: Progressing  6/2/2025 0341 by Cecelia Draper RN  Outcome: Progressing

## 2025-06-02 NOTE — PLAN OF CARE
Problem: Discharge Planning  Goal: Discharge to home or other facility with appropriate resources  6/2/2025 0341 by Cecelia Draper, RN  Outcome: Progressing  Flowsheets (Taken 6/1/2025 1945)  Discharge to home or other facility with appropriate resources:   Identify barriers to discharge with patient and caregiver   Arrange for needed discharge resources and transportation as appropriate   Identify discharge learning needs (meds, wound care, etc)   Refer to discharge planning if patient needs post-hospital services based on physician order or complex needs related to functional status, cognitive ability or social support system  6/1/2025 1608 by Tita Garcia, RN  Outcome: Progressing     Problem: Pain  Goal: Verbalizes/displays adequate comfort level or baseline comfort level  6/2/2025 0341 by Cecelia Draper, RN  Outcome: Progressing  Flowsheets (Taken 6/1/2025 1916)  Verbalizes/displays adequate comfort level or baseline comfort level:   Encourage patient to monitor pain and request assistance   Assess pain using appropriate pain scale   Administer analgesics based on type and severity of pain and evaluate response  6/1/2025 1608 by Tita Garcia, RN  Outcome: Progressing     Problem: Safety - Adult  Goal: Free from fall injury  Outcome: Progressing      Dr. Dutta at bedside.

## 2025-06-03 VITALS
RESPIRATION RATE: 15 BRPM | TEMPERATURE: 97.9 F | DIASTOLIC BLOOD PRESSURE: 72 MMHG | HEIGHT: 67 IN | SYSTOLIC BLOOD PRESSURE: 149 MMHG | WEIGHT: 131 LBS | BODY MASS INDEX: 20.56 KG/M2 | OXYGEN SATURATION: 92 % | HEART RATE: 77 BPM

## 2025-06-03 PROBLEM — I51.89 DIASTOLIC DYSFUNCTION WITHOUT HEART FAILURE: Status: ACTIVE | Noted: 2025-06-03

## 2025-06-03 PROBLEM — I10 MALIGNANT HYPERTENSION: Chronic | Status: ACTIVE | Noted: 2025-05-31

## 2025-06-03 PROCEDURE — 94640 AIRWAY INHALATION TREATMENT: CPT

## 2025-06-03 PROCEDURE — 2500000003 HC RX 250 WO HCPCS: Performed by: FAMILY MEDICINE

## 2025-06-03 PROCEDURE — 6370000000 HC RX 637 (ALT 250 FOR IP): Performed by: FAMILY MEDICINE

## 2025-06-03 PROCEDURE — 2700000000 HC OXYGEN THERAPY PER DAY

## 2025-06-03 PROCEDURE — 6360000002 HC RX W HCPCS: Performed by: FAMILY MEDICINE

## 2025-06-03 PROCEDURE — 97530 THERAPEUTIC ACTIVITIES: CPT

## 2025-06-03 PROCEDURE — 97535 SELF CARE MNGMENT TRAINING: CPT

## 2025-06-03 PROCEDURE — 94761 N-INVAS EAR/PLS OXIMETRY MLT: CPT

## 2025-06-03 RX ORDER — LISINOPRIL 40 MG/1
40 TABLET ORAL DAILY
Qty: 30 TABLET | Refills: 3 | Status: SHIPPED | OUTPATIENT
Start: 2025-06-04

## 2025-06-03 RX ORDER — GUAIFENESIN 1200 MG/1
1200 TABLET, EXTENDED RELEASE ORAL 2 TIMES DAILY
COMMUNITY
Start: 2025-06-03

## 2025-06-03 RX ORDER — HYDRALAZINE HYDROCHLORIDE 20 MG/ML
20 INJECTION INTRAMUSCULAR; INTRAVENOUS EVERY 4 HOURS PRN
Status: DISCONTINUED | OUTPATIENT
Start: 2025-06-03 | End: 2025-06-03 | Stop reason: HOSPADM

## 2025-06-03 RX ORDER — AMLODIPINE BESYLATE 10 MG/1
10 TABLET ORAL DAILY
Qty: 30 TABLET | Refills: 3 | Status: SHIPPED | OUTPATIENT
Start: 2025-06-04

## 2025-06-03 RX ORDER — PREDNISONE 10 MG/1
TABLET ORAL
Qty: 21 TABLET | Refills: 0 | Status: SHIPPED | OUTPATIENT
Start: 2025-06-03

## 2025-06-03 RX ORDER — HYDRALAZINE HYDROCHLORIDE 20 MG/ML
10 INJECTION INTRAMUSCULAR; INTRAVENOUS EVERY 4 HOURS PRN
Status: DISCONTINUED | OUTPATIENT
Start: 2025-06-03 | End: 2025-06-03 | Stop reason: HOSPADM

## 2025-06-03 RX ADMIN — BUDESONIDE 500 MCG: 0.5 INHALANT RESPIRATORY (INHALATION) at 08:12

## 2025-06-03 RX ADMIN — SODIUM CHLORIDE, PRESERVATIVE FREE 10 ML: 5 INJECTION INTRAVENOUS at 08:13

## 2025-06-03 RX ADMIN — GUAIFENESIN 600 MG: 600 TABLET, MULTILAYER, EXTENDED RELEASE ORAL at 08:07

## 2025-06-03 RX ADMIN — METHYLPREDNISOLONE SODIUM SUCCINATE 40 MG: 40 INJECTION INTRAMUSCULAR; INTRAVENOUS at 08:07

## 2025-06-03 RX ADMIN — LISINOPRIL 40 MG: 20 TABLET ORAL at 08:07

## 2025-06-03 RX ADMIN — PROPRANOLOL HYDROCHLORIDE 20 MG: 40 TABLET ORAL at 08:07

## 2025-06-03 RX ADMIN — ARFORMOTEROL TARTRATE 15 MCG: 15 SOLUTION RESPIRATORY (INHALATION) at 08:12

## 2025-06-03 RX ADMIN — AMLODIPINE BESYLATE 10 MG: 10 TABLET ORAL at 08:07

## 2025-06-03 RX ADMIN — ENOXAPARIN SODIUM 40 MG: 100 INJECTION SUBCUTANEOUS at 08:07

## 2025-06-03 RX ADMIN — IPRATROPIUM BROMIDE AND ALBUTEROL SULFATE 1 DOSE: 2.5; .5 SOLUTION RESPIRATORY (INHALATION) at 13:09

## 2025-06-03 RX ADMIN — AZITHROMYCIN DIHYDRATE 250 MG: 250 TABLET ORAL at 08:07

## 2025-06-03 RX ADMIN — IPRATROPIUM BROMIDE AND ALBUTEROL SULFATE 1 DOSE: 2.5; .5 SOLUTION RESPIRATORY (INHALATION) at 08:12

## 2025-06-03 NOTE — DISCHARGE SUMMARY
Hospitalist Discharge Summary   Admit Date:  2025  8:17 PM   DC Note date: 6/3/2025  Name:  Noemy Mckinney   Age:  80 y.o.  Sex:  female  :  1945   MRN:  [id number]   Room:  [facility]  PCP:  Darya Alexander DO    Presenting Complaint: Shortness of Breath     Initial Admission Diagnosis: Hypoxia [R09.02]  COPD exacerbation (HCC) [J44.1]  Elevated brain natriuretic peptide (BNP) level [R79.89]     Problem List for this Hospitalization (present on admission):    Principal Problem:    COPD exacerbation due to rhinovirus (HCC)  Active Problems:    Essential hypertension    Malignant hypertension    Rhinovirus infection    Diastolic dysfunction without heart failure  Resolved Problems:    * No resolved hospital problems. *      Hospital Course:  Noemy Mckinney is a 80 y.o. female with medical history of COPD who presented with dyspnea a few day duration associated with decreased p.o. intake.     In ED, O2 sat was 84% on room air and patient was placed on 4L O2 NC.  BNP 1560.  CXR shows no acute process.     Patient was admitted with acute respiratory failure secondary to acute COPD exacerbation.  She was started on 5 days of azithromycin, steroids and jet nebs.  Viral respiratory panel came back positive for rhinovirus.  TTE ordered for elevated BNP ands shows EF 55 to 60% with diastolic dysfunction.  Patient has no signs or symptoms of volume overload during hospital course.  Has elevation in WBC due to steroids.    Oxygen use slow to improve but is on 1 L at rest.  I do not hear any wheezing on exam; if it is present it is too faint for me to hear.   RT is coming in to evaluate as well.  She may need oxygen at home.  Will also give a longer prednisone course than usual.  She completed antibiotics here.    Blood pressure medications adjusted for better hypertension control.  However she will need to monitor at home and follow-up with PCP.     Patient has wanted to go home for a couple days now.

## 2025-06-03 NOTE — PROGRESS NOTES
Hospitalist Progress Note   Admit Date:  2025  8:17 PM   Name:  Noemy Mckinney   Age:  80 y.o.  Sex:  female  :  1945   MRN:  [id number]   Room:  [facility]/    Presenting/Chief Complaint: Shortness of Breath     Reason(s) for Admission: Hypoxia [R09.02]  COPD exacerbation (HCC) [J44.1]  Elevated brain natriuretic peptide (BNP) level [R79.89]     Hospital Course:   Noemy Mckinney is a 80 y.o. female with medical history of COPD who presented with dyspnea a few day duration associated with decreased p.o. intake.    In ED, O2 sat was 84% on room air and patient was placed on 4L O2 NC.  BNP 1560.  CXR shows no acute process.    Patient was admitted with acute respiratory failure secondary to acute COPD exacerbation.  She was started on 5 days of azithromycin, steroids and jet nebs.  TTE ordered for elevated BNP.    Subjective & 24hr Events:     Patient remains on 3L O2 NC this morning.  Leukocytosis resolved.  At bedside patient was a poor historian but overall alert and orient x 3.  Stated that she still has dyspnea and wheezing.  No fever or chills.      Assessment & Plan:     Acute respiratory failure with hypoxia  COPD exacerbation   O2 sat 84% on room air in the ED and patient was placed on 4L O2 NC.  CXR shows no acute process.  Cont Azithromycin for 5 days  Cont Solumedrol 40mg IV TID  Cont Duo-nebs scheduled,Albuterol prn  Add Pulmicort/Brovana   Add Mucinex BID, antitussives prn  Obtain VRP/Covid-19  On 3LO2NC, wean as tolerated    Malignant hypertension  Hx of HTN  BP as high as 226/123 on admission  Continue home propranolol  Cont home Lisinopril--increase dose to 40mg qd    Elevated brain natriuretic peptide (BNP) level  BNP 1560 on admission.  Patient appears euvolemic.  CXR shows no volume overload.  TTE ordered and pending        Anticipated Discharge Arrangements:   Anticipate discharge in 2-3 days pending weaning O2.  PT/OT to evaluate as well.    PT/OT evals ordered?  Therapy evals 
       Hospitalist Progress Note   Admit Date:  2025  8:17 PM   Name:  Noemy Mckinney   Age:  80 y.o.  Sex:  female  :  1945   MRN:  [id number]   Room:  [facility]/    Presenting/Chief Complaint: Shortness of Breath     Reason(s) for Admission: Hypoxia [R09.02]  COPD exacerbation (HCC) [J44.1]  Elevated brain natriuretic peptide (BNP) level [R79.89]     Hospital Course:   Noemy Mckinney is a 80 y.o. female with medical history of COPD who presented with dyspnea a few day duration associated with decreased p.o. intake.    In ED, O2 sat was 84% on room air and patient was placed on 4L O2 NC.  BNP 1560.  CXR shows no acute process.    Patient was admitted with acute respiratory failure secondary to acute COPD exacerbation.  She was started on 5 days of azithromycin, steroids and jet nebs.  Viral respiratory panel came back positive for rhinovirus.  TTE ordered for elevated BNP ands shows EF 55 to 60% with diastolic dysfunction.  Patient has no signs or symptoms of volume overload during hospital course.  Has elevation in WBC due to steroids.    PT/OT recommended home health     Subjective & 24hr Events:     Patient remains on 3L O2 NC this morning.  Elevated leukocytosis most likely secondary to IV steroids.  RT did walking pulse ox and patient still requires 2L O2 NC at rest and 3L O2 NC on exertion.  She still has bilateral wheezing this morning.  Desires to go home but wants to be well first.  No chest pain or fever or chills.    Assessment & Plan:     Acute respiratory failure with hypoxia  COPD exacerbation   Rhinovirus infection  O2 sat 84% on room air in the ED and patient was placed on 4L O2 NC.  CXR shows no acute process.  VRP/COVID-19 was positive for rhinovirus  Cont Azithromycin for 5 days  Cont Solumedrol 40mg--tapered to BID  Cont Duo-nebs scheduled,Albuterol prn  Continue Pulmicort/Brovana   Continue Mucinex BID, antitussives prn  On 3LO2NC, wean as tolerated  Continue course of treatment  
   06/02/25 0931   Resting (Room Air)   SpO2 85   HR 64   Resting (On O2)   SpO2 97   HR 64   O2 Device Nasal cannula   O2 Flow Rate (l/min) 2 l/min   During Walk (Room Air)   SpO2 80   Walk/Assistance Device   (putting shoes on)   Rate of Dyspnea 2   Symptoms Fatigue   During Walk (On O2)   SpO2 93   HR 80   O2 Device Nasal cannula   O2 Flow Rate (l/min) 3 l/min   Walk/Assistance Device Ambulation   Rate of Dyspnea 2   Symptoms Fatigue   After Walk   SpO2 95   HR 82   O2 Device Nasal cannula   O2 Flow Rate (l/min) 3 l/min   Rate of Dyspnea 0   Does the Patient Qualify for Home O2 Yes   Liter Flow at Rest 2   Liter Flow on Exertion 3   Does the Patient Need Portable Oxygen Tanks Yes       
ACUTE OCCUPATIONAL THERAPY GOALS:   (Developed with and agreed upon by patient and/or caregiver.)  1. Patient will perform grooming with supervision and adaptive equipment as needed.  2. Patient will perform upper body dressing with mod I and adaptive equipment as needed.  3. Patient will perform lower body dressing with supervision and adaptive equipment as needed.  4. Patient will perform bathing with supervision and adaptive equipment as needed.  5. Patient will perform toileting and toilet transfer with supervision and adaptive equipment as needed.  6. Patient will perform ADL functional mobility and tranfers in room with supervision and adaptive equipment as needed.  7. Patient/family to demonstrate knowledge of home safety and DME recommendations.    Timeframe: 2-3 visits     OCCUPATIONAL THERAPY Initial Assessment, Daily Note, and PM       OT Visit Days: 1  Acknowledge Orders  Time  OT Charge Capture  Rehab Caseload Tracker      Noemy Mckinney is a 80 y.o. female   PRIMARY DIAGNOSIS: COPD exacerbation (HCC)  Hypoxia [R09.02]  COPD exacerbation (HCC) [J44.1]  Elevated brain natriuretic peptide (BNP) level [R79.89]       Reason for Referral: Generalized Muscle Weakness (M62.81)  Inpatient: Payor: MEDICARE / Plan: MEDICARE PART A AND B / Product Type: *No Product type* /     ASSESSMENT:     REHAB RECOMMENDATIONS:   Recommendation to date pending progress:  Setting:  No further skilled occupational therapy after discharge from hospital    Equipment:    None     ASSESSMENT:  Ms. Mckinney is an 80 y.o female s/p COPD/SOB with decreased functional endurance and increased fatigue. Patient was cooperative and willing to participate in evaluation and therapy today. Patient presented with 3.5 L O2 via NC upon arrival and throughout session. Pt demonstrated generalized weakness, but functional strength and ROM. Pt ambulated with DEMETRI to and from the bathroom to complete toileting, grooming, and dressing. Patient 
ACUTE PHYSICAL THERAPY GOALS:   (Developed with and agreed upon by patient and/or caregiver.)  DISCHARGE GOALS :  (1.)Ms. Mckinney will move from supine to sit and sit to supine  with SUPERVISION .    (2.)Ms. Mckinney will transfer from bed to chair and chair to bed with SUPERVISION with a device if needed.  (3.)Ms. Mckinney will ambulate with SUPERVISION for 150-200 feet with a device if needed.  4) Pt safe with HEP throughout the day to increase activity level.     ________________________     PHYSICAL THERAPY Daily Note and AM  (Link to Caseload Tracking: PT Visit Days : 4  Acknowledge Orders  Time In/Out  PT Charge Capture  Rehab Caseload Tracker    Noemy Mckinney is a 80 y.o. female   PRIMARY DIAGNOSIS: COPD exacerbation (HCC)  Hypoxia [R09.02]  COPD exacerbation (HCC) [J44.1]  Elevated brain natriuretic peptide (BNP) level [R79.89]       Reason for Referral: Generalized Muscle Weakness (M62.81)  Other lack of cordination (R27.8)  Difficulty in walking, Not elsewhere classified (R26.2)  Inpatient: Payor: MEDICARE / Plan: MEDICARE PART A AND B / Product Type: *No Product type* /     ASSESSMENT:     REHAB RECOMMENDATIONS:   Recommendation to date pending progress:  Setting:  Home Health Therapy  If goals not met by DC    Equipment:    To Be Determined     ASSESSMENT:  Ms. Mckinney was admitted with COPD exacerbation & associated decreased functional strength & mobility.  Pt lives with her spouse in a 1 level home with no stairs at the entry & was functionally independence without 02 support or an assistive device prior to this admission & Dx. This pt is functioning below her baseline & will benefit from follow up acute PT to help restore safer more independent function prior to returning home. Also would advise HHPT  to follow up if goals not met by DC. PT will continue.    6/2/25 : Patient supine upon contact. O2 was decreased from 3L to 2L overnight. Walk test ordered by MD. RT contacted to see patient with PT.  
ACUTE PHYSICAL THERAPY GOALS:   (Developed with and agreed upon by patient and/or caregiver.)  DISCHARGE GOALS :  (1.)Ms. Mckinney will move from supine to sit and sit to supine  with SUPERVISION .    (2.)Ms. Mckinney will transfer from bed to chair and chair to bed with SUPERVISION with a device if needed.  (3.)Ms. Mckinney will ambulate with SUPERVISION for 150-200 feet with a device if needed.  4) Pt safe with HEP throughout the day to increase activity level.     ________________________     PHYSICAL THERAPY Initial Assessment and PM  (Link to Caseload Tracking: PT Visit Days : 1  Acknowledge Orders  Time In/Out  PT Charge Capture  Rehab Caseload Tracker    Noemy Mckinney is a 80 y.o. female   PRIMARY DIAGNOSIS: COPD exacerbation (HCC)  Hypoxia [R09.02]  COPD exacerbation (HCC) [J44.1]  Elevated brain natriuretic peptide (BNP) level [R79.89]       Reason for Referral: Generalized Muscle Weakness (M62.81)  Other lack of cordination (R27.8)  Difficulty in walking, Not elsewhere classified (R26.2)  Inpatient: Payor: MEDICARE / Plan: MEDICARE PART A AND B / Product Type: *No Product type* /     ASSESSMENT:     REHAB RECOMMENDATIONS:   Recommendation to date pending progress:  Setting:  Home Health Therapy  If goals not met by DC    Equipment:    To Be Determined     ASSESSMENT:  Ms. Mckinney was admitted with COPD exacerbation & associated decreased functional strength & mobility. This pt was on 3 liters of 02 throughout session. Pt had a brief drop of 02 sats in the mid to high 80's after gait but quickly recovered in ~30 sec to 90% with deep breathing. Pt was able to perform exercises with 02 sat 92% post exercise. Pt lives with her spouse in a 1 level home with no stairs at the entry & was functionally independence without 02 support or an assistive device prior to this admission & Dx. This pt is functioning below her baseline & will benefit from follow up acute PT to help restore safer more independent function 
Patient discharged to home with family at side. Patient IV pulled. Patient given AVS with opportunities for questions provided. Patient verbalized understanding with no questions noted.   
Patient is borderline for home oxygen qualifier.     Oxygen with oximetry test performed on room air.     During ambulation patient's sat was 91% on room air - does not qualify for oxygen with ambulation.     While sitting on room air, patients sat was 88% - Patient asked to take 10 deep breaths:  After 5 deep breaths patient's sat increased to 91% on room air.    After 10 deep breaths patient's sat increased to 93% on room air.        06/03/25 1346   Resting (Room Air)   SpO2 88  (Shallow breathing at rest - after 5 deep breaths patient's sat increased to 91% on room air.)   HR 77   Resting (On O2)   SpO2 91   HR 77   O2 Device Nasal cannula   O2 Flow Rate (l/min) 1 l/min   During Walk (Room Air)   SpO2 91   HR 82   Walk/Assistance Device Walker   Rate of Dyspnea 0   After Walk   SpO2 90   HR 76   Does the Patient Qualify for Home O2 No   Liter Flow at Rest 0   Liter Flow on Exertion 0   Does the Patient Need Portable Oxygen Tanks No       
Able to Live on Main level with bedroom/bathroom (pt will be moving to [place] in 10 days, one level home)  Bathroom Shower/Tub: Walk-in shower, Shower chair with back  Bathroom Toilet: Standard  Bathroom Equipment: Shower chair  Prior Level of Assist for ADLs: Independent  Prior Level of Assist for Homemaking: Independent  Prior Level of Assist for Transfers: Independent  Active : Yes  Mode of Transportation: Car  Occupation: Retired    OBJECTIVE:     PAIN: VITALS / O2: PRECAUTION / LINES / DRAINS:   Pre Treatment:   Pain Assessment: None - Denies Pain      Post Treatment: 0/10 Vitals NT       Oxygen 2L initially; had to increase 3L      02 support    RESTRICTIONS/PRECAUTIONS:  Restrictions/Precautions: Fall Risk  Activity Level: Up with Assist                 GROSS EVALUATION:  Intact Impaired (Comments):   AROM []  Decreased but functional throughout    PROM []    Strength []   Decreased but functional throughout   Balance []  Fair- standing stat/dyn   Posture [] N/A   Sensation [x]  grossly   Coordination []    Decreased but functional throughout   Tone []  Hypotonus throughout   Edema []    Activity Tolerance []  poor    []      COGNITION/  PERCEPTION: Intact Impaired (Comments):   Orientation [x]     Vision [x]     Hearing [x]     Cognition  [x]       MOBILITY: I Mod I S SBA CGA Min Mod Max Total  NT x2 Comments:   Bed Mobility    Rolling [] [] [] [x] [] [] [] [] [] [] []    Supine to Sit [] [] [] [x] [] [] [] [] [] [] []    Scooting [] [] [] [x] [] [] [] [] [] [] []    Sit to Supine [] [] [] [x] [] [] [] [] [] [] []    Transfers    Sit to Stand [] [] [] [] [x] [x] [] [] [] [] []    Bed to Chair [] [] [] [] [x] [x] [] [] [] [] []    Stand to Sit [] [] [] [] [x] [] [] [] [] [] []     [] [] [] [] [] [] [] [] [] [] []    I=Independent, Mod I=Modified Independent, S=Supervision, SBA=Standby Assistance, CGA=Contact Guard Assistance,   Min=Minimal Assistance, Mod=Moderate Assistance, Max=Maximal Assistance, 
Retired    OBJECTIVE:     PAIN: VITALS / O2: PRECAUTION / LINES / DRAINS:   Pre Treatment:   Pain Assessment: None - Denies Pain      Post Treatment: 0/10 Vitals NT       Oxygen 3 liters of 02      IV and 02 support    RESTRICTIONS/PRECAUTIONS:  Restrictions/Precautions: Fall Risk  Activity Level: Up with Assist                 GROSS EVALUATION:  Intact Impaired (Comments):   AROM []  Decreased but functional throughout    PROM []    Strength []   Decreased but functional throughout   Balance []  Fair- standing stat/dyn   Posture [] N/A   Sensation [x]  grossly   Coordination []    Decreased but functional throughout   Tone []  Hypotonus throughout   Edema []    Activity Tolerance []  poor    []      COGNITION/  PERCEPTION: Intact Impaired (Comments):   Orientation [x]     Vision [x]     Hearing [x]     Cognition  [x]       MOBILITY: I Mod I S SBA CGA Min Mod Max Total  NT x2 Comments:   Bed Mobility    Rolling [] [] [] [x] [] [] [] [] [] [] []    Supine to Sit [] [] [] [x] [] [] [] [] [] [] []    Scooting [] [] [] [x] [] [] [] [] [] [] []    Sit to Supine [] [] [] [x] [] [] [] [] [] [] []    Transfers    Sit to Stand [] [] [] [] [x] [] [] [] [] [] []    Bed to Chair [] [] [] [] [x] [] [] [] [] [] []    Stand to Sit [] [] [] [] [x] [] [] [] [] [] []     [] [] [] [] [] [] [] [] [] [] []    I=Independent, Mod I=Modified Independent, S=Supervision, SBA=Standby Assistance, CGA=Contact Guard Assistance,   Min=Minimal Assistance, Mod=Moderate Assistance, Max=Maximal Assistance, Total=Total Assistance, NT=Not Tested    GAIT: I Mod I S SBA CGA Min Mod Max Total  NT x2 Comments:   Level of Assistance [] [] [] [] [x] [] [] [] [] [] []    Distance 80 feet    DME None    Gait Quality Decreased step clearance, Decreased step length, Path deviations , and Trunk sway increased    Weightbearing Status Restrictions/Precautions  Restrictions/Precautions: Fall Risk    Stairs  N/A    I=Independent, Mod I=Modified Independent, 
  Lower Body Dressing [] [] [x] [x] [] [] [] [] [] []  Donned her shoes   Feeding [x] [] [] [] [] [] [] [] [] []    Grooming [] [] [x] [] [] [] [] [] [] [] Sup/sba while standing at sink   Personal Device Care [] [] [] [] [] [] [] [] [] [x]    Functional Mobility [] [] [] [x] [x] [] [] [] [] []  ROLLING WALKER into the hallway and back   I=Independent, Mod I=Modified Independent, S=Supervision/Setup, SBA=Standby Assistance, CGA=Contact Guard Assistance, Min=Minimal Assistance, Mod=Moderate Assistance, Max=Maximal Assistance, Total=Total Assistance, NT=Not Tested    PLAN:   FREQUENCY/DURATION     for duration of hospital stay or until stated goals are met, whichever comes first.    PROBLEM LIST:   (Skilled intervention is medically necessary to address:)  Decreased ADL/Functional Activities  Decreased Activity Tolerance  Decreased Balance  Decreased Strength   INTERVENTIONS PLANNED:  (Benefits and precautions of occupational therapy have been discussed with the patient.)  Self Care Training  Therapeutic Activity  Therapeutic Exercise/HEP  Manual Therapy  Education         TREATMENT:     EVALUATION: LOW COMPLEXITY: (Untimed Charge)    TREATMENT:    SELF CARE: (25 minutes):   Procedure(s) (per grid) utilized to improve and/or restore self-care/home management as related to dressing and functional mobility and home safety with o2 and walker use . Required minimal visual, verbal, and tactile cueing to facilitate activities of daily living skills, compensatory activities, and discharge planning .      AFTER TREATMENT PRECAUTIONS: Alarm Activated, Bed/Chair Locked, Call light within reach, Chair, Needs within reach, and RN notified    INTERDISCIPLINARY COLLABORATION:  RN/ PCT and PT/ PTA    EDUCATION:  Education Given To: Patient;Other (Comment)  Education Provided: Role of Therapy;Plan of Care;IADL Safety;Energy Conservation;Transfer Training;ADL Adaptive Strategies;Mobility Training;Fall Prevention 
Immature Granulocytes % 0.4 0.0 - 5.0 %    Neutrophils Absolute 8.60 (H) 1.70 - 8.20 K/UL    Lymphocytes Absolute 1.82 0.50 - 4.60 K/UL    Monocytes Absolute 1.03 0.10 - 1.30 K/UL    Eosinophils Absolute 0.04 0.00 - 0.80 K/UL    Basophils Absolute 0.04 0.00 - 0.20 K/UL    Immature Granulocytes Absolute 0.05 0.0 - 0.5 K/UL   CMP    Collection Time: 05/30/25  9:13 PM   Result Value Ref Range    Sodium 140 136 - 145 mmol/L    Potassium 4.5 3.5 - 5.1 mmol/L    Chloride 103 98 - 107 mmol/L    CO2 23 20 - 29 mmol/L    Anion Gap 14 7 - 16 mmol/L    Glucose 111 (H) 70 - 99 mg/dL    BUN 19 8 - 23 MG/DL    Creatinine 0.79 0.60 - 1.10 MG/DL    Est, Glom Filt Rate 75 >60 ml/min/1.73m2    Calcium 9.3 8.8 - 10.2 MG/DL    Total Bilirubin 0.5 0.0 - 1.2 MG/DL    ALT 16 8 - 45 U/L    AST 31 15 - 37 U/L    Alk Phosphatase 127 (H) 35 - 104 U/L    Total Protein 6.8 6.3 - 8.2 g/dL    Albumin 3.4 3.2 - 4.6 g/dL    Globulin 3.4 2.3 - 3.5 g/dL    Albumin/Globulin Ratio 1.0 1.0 - 1.9     Troponin    Collection Time: 05/30/25  9:13 PM   Result Value Ref Range    Troponin T 12.3 0 - 14 ng/L   Brain Natriuretic Peptide    Collection Time: 05/30/25  9:13 PM   Result Value Ref Range    NT Pro-BNP 1,560 (H) 0 - 450 PG/ML   Troponin    Collection Time: 05/30/25 10:53 PM   Result Value Ref Range    Troponin T 13.5 0 - 14 ng/L   Basic Metabolic Panel w/ Reflex to MG    Collection Time: 05/31/25  4:53 AM   Result Value Ref Range    Sodium 139 136 - 145 mmol/L    Potassium 4.2 3.5 - 5.1 mmol/L    Chloride 105 98 - 107 mmol/L    CO2 24 20 - 29 mmol/L    Anion Gap 10 7 - 16 mmol/L    Glucose 184 (H) 70 - 99 mg/dL    BUN 22 8 - 23 MG/DL    Creatinine 0.80 0.60 - 1.10 MG/DL    Est, Glom Filt Rate 74 >60 ml/min/1.73m2    Calcium 9.1 8.8 - 10.2 MG/DL   CBC with Auto Differential    Collection Time: 05/31/25  4:53 AM   Result Value Ref Range    WBC 9.4 4.3 - 11.1 K/uL    RBC 5.34 (H) 4.05 - 5.2 M/uL    Hemoglobin 15.3 11.7 - 15.4 g/dL    Hematocrit 45.5 35.8

## 2025-06-03 NOTE — CARE COORDINATION
Pt is for discharge home today with spouse and HH PT/RN services.  Referral sent to [facility] for follow up home care as ordered.  Patient denies need for RW as she has one at home for use. No additional CM orders received or supportive care needs expressed at this time.       06/03/25 1319   Services At/After Discharge   Transition of Care Consult (CM Consult) Home Health   Internal Home Health No   Reason Outside Agency Chosen Script used patient chose alternate agency   Services At/After Discharge Home Health;Nursing services;PT   [place] Resource Information Provided? No   Mode of Transport at Discharge Self   Confirm Follow Up Transport Family   Condition of Participation: Discharge Planning   The Plan for Transition of Care is related to the following treatment goals: Patient is returning home with family and HH services.   The Patient and/or Patient Representative was provided with a Choice of Provider? Patient   The Patient and/Or Patient Representative agree with the Discharge Plan? Yes   Freedom of Choice list was provided with basic dialogue that supports the patient's individualized plan of care/goals, treatment preferences, and shares the quality data associated with the providers?  Yes

## 2025-06-03 NOTE — PLAN OF CARE
Problem: Discharge Planning  Goal: Discharge to home or other facility with appropriate resources  Outcome: Progressing  Flowsheets (Taken 6/3/2025 0214)  Discharge to home or other facility with appropriate resources:   Identify barriers to discharge with patient and caregiver   Arrange for needed discharge resources and transportation as appropriate   Identify discharge learning needs (meds, wound care, etc)     Problem: Pain  Goal: Verbalizes/displays adequate comfort level or baseline comfort level  Outcome: Progressing  Flowsheets (Taken 6/3/2025 0214)  Verbalizes/displays adequate comfort level or baseline comfort level:   Encourage patient to monitor pain and request assistance   Assess pain using appropriate pain scale   Administer analgesics based on type and severity of pain and evaluate response     Problem: Safety - Adult  Goal: Free from fall injury  Outcome: Progressing  Flowsheets (Taken 6/3/2025 0214)  Free From Fall Injury: Instruct family/caregiver on patient safety

## 2025-06-03 NOTE — PLAN OF CARE
Problem: Discharge Planning  Goal: Discharge to home or other facility with appropriate resources  6/3/2025 0956 by Saida Daniels RN  Outcome: Progressing  6/3/2025 0214 by Radha Shultz RN  Outcome: Progressing  Flowsheets (Taken 6/3/2025 0214)  Discharge to home or other facility with appropriate resources:   Identify barriers to discharge with patient and caregiver   Arrange for needed discharge resources and transportation as appropriate   Identify discharge learning needs (meds, wound care, etc)     Problem: Pain  Goal: Verbalizes/displays adequate comfort level or baseline comfort level  6/3/2025 0956 by Saida Daniels RN  Outcome: Progressing  6/3/2025 0214 by Radha Shultz RN  Outcome: Progressing  Flowsheets (Taken 6/3/2025 0214)  Verbalizes/displays adequate comfort level or baseline comfort level:   Encourage patient to monitor pain and request assistance   Assess pain using appropriate pain scale   Administer analgesics based on type and severity of pain and evaluate response     Problem: Safety - Adult  Goal: Free from fall injury  6/3/2025 0956 by Saida Daniels RN  Outcome: Progressing  6/3/2025 0214 by Radha Shultz RN  Outcome: Progressing  Flowsheets (Taken 6/3/2025 0214)  Free From Fall Injury: Instruct family/caregiver on patient safety

## 2025-06-04 ENCOUNTER — CARE COORDINATION (OUTPATIENT)
Dept: CARE COORDINATION | Facility: CLINIC | Age: 80
End: 2025-06-04

## 2025-06-04 DIAGNOSIS — J44.1 COPD EXACERBATION (HCC): Primary | ICD-10-CM

## 2025-06-04 PROCEDURE — 1111F DSCHRG MED/CURRENT MED MERGE: CPT | Performed by: FAMILY MEDICINE

## 2025-06-04 NOTE — CARE COORDINATION
Care Transitions Note    Initial Call - Call within 2 business days of discharge: Yes    Patient Current Location:  Home: [address] [place] [phone]    Care Transition Nurse contacted the patient by telephone to perform post hospital discharge assessment, verified name and  as identifiers.  Provided introduction to self, and explanation of the Care Transition Nurse role.    Patient: Noemy Mckinney    Patient : 1945   MRN: [id number]    Reason for Admission: COPD due to rhinovirus  Discharge Date: 6/3/25  RURS: Readmission Risk Score: 11.2      Last Discharge Facility       Date Complaint Diagnosis Description Type Department Provider    25 Shortness of Breath COPD exacerbation (HCC) ... ED to Hosp-Admission (Discharged) (ADMITTED) M[facility] Puma Brito MD; Edy,...            Was this an external facility discharge? No    Additional needs identified to be addressed with provider   No needs identified             Method of communication with provider: none.    Patients top risk factors for readmission: medical condition-COPD    Interventions to address risk factors:   Review of patient management of conditions/medications: reviewed recent IP admission, medications, declined HFU    Care Summary Note: Patient home after recent IP admission. Patient denies any acute concerns or needs and declines PCP follow up due to moving out of state next week. Patient indicated her daughter is setting up providers for her in [place]. Patient declines further need for follow up and CTN offered contact number for any questions/concerns.    Care Transition Nurse reviewed discharge instructions, medical action plan, and red flags with patient. The patient was given an opportunity to ask questions; all questions answered at this time.. The patient demonstrated understanding using teach back method.   Were discharge instructions available to patient? Yes.   Reviewed appropriate site of care based on